# Patient Record
Sex: MALE | Race: WHITE | NOT HISPANIC OR LATINO | URBAN - NONMETROPOLITAN AREA
[De-identification: names, ages, dates, MRNs, and addresses within clinical notes are randomized per-mention and may not be internally consistent; named-entity substitution may affect disease eponyms.]

---

## 2023-08-23 PROBLEM — M19.92 POST-TRAUMATIC OSTEOARTHRITIS: Status: ACTIVE | Noted: 2023-08-23

## 2023-08-23 PROBLEM — M17.2 BILATERAL POST-TRAUMATIC OSTEOARTHRITIS OF KNEE: Status: ACTIVE | Noted: 2023-08-23

## 2023-08-23 PROBLEM — J44.9 COPD (CHRONIC OBSTRUCTIVE PULMONARY DISEASE) (MULTI): Status: ACTIVE | Noted: 2023-08-23

## 2023-08-23 PROBLEM — I10 HYPERTENSION: Status: ACTIVE | Noted: 2023-08-23

## 2023-08-23 PROBLEM — K21.9 GERD (GASTROESOPHAGEAL REFLUX DISEASE): Status: ACTIVE | Noted: 2023-08-23

## 2023-08-23 PROBLEM — Q66.6 VALGUS DEFORMITY OF BOTH FEET: Status: ACTIVE | Noted: 2023-08-23

## 2023-08-23 PROBLEM — Z96.9 RETAINED ORTHOPEDIC HARDWARE: Status: ACTIVE | Noted: 2023-08-23

## 2023-08-23 PROBLEM — F43.0 ACUTE STRESS DISORDER: Status: ACTIVE | Noted: 2023-08-23

## 2023-08-23 PROBLEM — S33.9XXA SPRAIN, LUMBOSACRAL: Status: ACTIVE | Noted: 2023-08-23

## 2023-08-23 PROBLEM — M17.12 PRIMARY OSTEOARTHRITIS OF LEFT KNEE: Status: ACTIVE | Noted: 2023-08-23

## 2023-08-23 PROBLEM — M21.70 LEG LENGTH DISCREPANCY: Status: ACTIVE | Noted: 2023-08-23

## 2023-08-23 RX ORDER — FLUTICASONE FUROATE, UMECLIDINIUM BROMIDE AND VILANTEROL TRIFENATATE 200; 62.5; 25 UG/1; UG/1; UG/1
1 POWDER RESPIRATORY (INHALATION) EVERY 24 HOURS
COMMUNITY
Start: 2023-05-02

## 2023-08-23 RX ORDER — NEBIVOLOL 20 MG/1
20 TABLET ORAL DAILY
COMMUNITY
Start: 2023-05-17 | End: 2024-05-23 | Stop reason: SDUPTHER

## 2023-08-23 RX ORDER — ALBUTEROL SULFATE 90 UG/1
2 AEROSOL, METERED RESPIRATORY (INHALATION) EVERY 4 HOURS PRN
COMMUNITY
Start: 2023-05-02 | End: 2023-11-27 | Stop reason: SDUPTHER

## 2023-09-05 PROBLEM — M25.462 EFFUSION OF LEFT KNEE: Status: ACTIVE | Noted: 2023-09-05

## 2023-09-05 PROBLEM — M25.562 PAIN OF LEFT KNEE AFTER INJURY: Status: ACTIVE | Noted: 2023-09-05

## 2023-09-05 PROBLEM — I82.4Z2 ACUTE DEEP VEIN THROMBOSIS (DVT) OF DISTAL VEIN OF LEFT LOWER EXTREMITY (MULTI): Status: RESOLVED | Noted: 2023-09-05 | Resolved: 2023-09-05

## 2023-09-05 PROBLEM — S82.023A: Status: ACTIVE | Noted: 2023-09-05

## 2023-09-05 PROBLEM — S82.143A TIBIAL PLATEAU FRACTURE: Status: ACTIVE | Noted: 2023-09-05

## 2023-09-05 PROBLEM — M25.662 STIFFNESS OF LEFT KNEE, NOT ELSEWHERE CLASSIFIED: Status: ACTIVE | Noted: 2023-09-05

## 2023-09-05 PROBLEM — S82.142A CLOSED BICONDYLAR FRACTURE OF LEFT TIBIAL PLATEAU: Status: ACTIVE | Noted: 2023-09-05

## 2023-09-05 RX ORDER — OXYCODONE AND ACETAMINOPHEN 5; 325 MG/1; MG/1
1 TABLET ORAL EVERY 4 HOURS PRN
COMMUNITY
Start: 2018-04-05 | End: 2024-01-08 | Stop reason: ALTCHOICE

## 2023-09-05 RX ORDER — OXYCODONE AND ACETAMINOPHEN 10; 325 MG/1; MG/1
1 TABLET ORAL EVERY 6 HOURS PRN
COMMUNITY
Start: 2018-04-03 | End: 2024-01-08 | Stop reason: ALTCHOICE

## 2023-09-05 RX ORDER — DOCUSATE SODIUM 100 MG/1
1 CAPSULE, LIQUID FILLED ORAL 2 TIMES DAILY
COMMUNITY
Start: 2018-04-05 | End: 2024-01-08 | Stop reason: ALTCHOICE

## 2023-09-05 RX ORDER — PANTOPRAZOLE SODIUM 20 MG/1
1 TABLET, DELAYED RELEASE ORAL DAILY
COMMUNITY
Start: 2018-04-06 | End: 2024-01-08 | Stop reason: ALTCHOICE

## 2023-09-05 RX ORDER — ASPIRIN 81 MG/1
1 TABLET ORAL 2 TIMES DAILY
COMMUNITY
Start: 2018-04-06 | End: 2024-01-08 | Stop reason: ALTCHOICE

## 2023-09-05 RX ORDER — HYDROCODONE BITARTRATE AND ACETAMINOPHEN 10; 325 MG/1; MG/1
1 TABLET ORAL 3 TIMES DAILY PRN
COMMUNITY
Start: 2018-02-05 | End: 2024-01-08 | Stop reason: ALTCHOICE

## 2023-11-27 ENCOUNTER — TELEPHONE (OUTPATIENT)
Dept: PRIMARY CARE | Facility: CLINIC | Age: 63
End: 2023-11-27
Payer: COMMERCIAL

## 2023-11-27 DIAGNOSIS — J41.0 SIMPLE CHRONIC BRONCHITIS (MULTI): Primary | ICD-10-CM

## 2023-11-27 RX ORDER — ALBUTEROL SULFATE 90 UG/1
2 AEROSOL, METERED RESPIRATORY (INHALATION) EVERY 4 HOURS PRN
Qty: 18 G | Refills: 6 | Status: SHIPPED | OUTPATIENT
Start: 2023-11-27 | End: 2024-01-08 | Stop reason: ALTCHOICE

## 2023-12-29 ENCOUNTER — APPOINTMENT (OUTPATIENT)
Dept: PRIMARY CARE | Facility: CLINIC | Age: 63
End: 2023-12-29
Payer: COMMERCIAL

## 2024-01-08 ENCOUNTER — OFFICE VISIT (OUTPATIENT)
Dept: PRIMARY CARE | Facility: CLINIC | Age: 64
End: 2024-01-08
Payer: COMMERCIAL

## 2024-01-08 VITALS
DIASTOLIC BLOOD PRESSURE: 80 MMHG | BODY MASS INDEX: 26.63 KG/M2 | HEIGHT: 70 IN | WEIGHT: 186 LBS | TEMPERATURE: 98 F | SYSTOLIC BLOOD PRESSURE: 130 MMHG | OXYGEN SATURATION: 97 % | HEART RATE: 79 BPM

## 2024-01-08 DIAGNOSIS — I10 PRIMARY HYPERTENSION: Primary | ICD-10-CM

## 2024-01-08 PROCEDURE — 3079F DIAST BP 80-89 MM HG: CPT | Performed by: FAMILY MEDICINE

## 2024-01-08 PROCEDURE — 3075F SYST BP GE 130 - 139MM HG: CPT | Performed by: FAMILY MEDICINE

## 2024-01-08 PROCEDURE — 99213 OFFICE O/P EST LOW 20 MIN: CPT | Performed by: FAMILY MEDICINE

## 2024-01-08 ASSESSMENT — PATIENT HEALTH QUESTIONNAIRE - PHQ9
2. FEELING DOWN, DEPRESSED OR HOPELESS: NOT AT ALL
1. LITTLE INTEREST OR PLEASURE IN DOING THINGS: NOT AT ALL
SUM OF ALL RESPONSES TO PHQ9 QUESTIONS 1 AND 2: 0

## 2024-01-08 ASSESSMENT — PAIN SCALES - GENERAL: PAINLEVEL: 0-NO PAIN

## 2024-01-08 ASSESSMENT — ENCOUNTER SYMPTOMS
OCCASIONAL FEELINGS OF UNSTEADINESS: 0
LOSS OF SENSATION IN FEET: 0
DEPRESSION: 0

## 2024-01-08 NOTE — PROGRESS NOTES
"Subjective:   Andrez Flores is a 63 y.o. male with hypertension.  Current Outpatient Medications   Medication Sig Dispense Refill    fluticasone-umeclidin-vilanter (Trelegy Ellipta) 200-62.5-25 mcg blister with device Inhale 1 puff once every 24 hours.      nebivolol (Bystolic) 20 mg tablet Take 1 tablet (20 mg) by mouth once daily.       No current facility-administered medications for this visit.      Hypertension ROS: taking medications as instructed, no medication side effects noted, no TIA's, no chest pain on exertion, no dyspnea on exertion, and no swelling of ankles.   New concerns: none  .     Objective:   /80   Pulse 79   Temp 36.7 °C (98 °F)   Ht 1.778 m (5' 10\")   Wt 84.4 kg (186 lb)   SpO2 97%   BMI 26.69 kg/m²    Appearance alert, well appearing, and in no distress.  General exam BP noted to be well controlled today in office, S1, S2 normal, no gallop, no murmur, chest clear, no JVD, no HSM, no edema.   Lab review: no lab studies available for review at time of visit.     Assessment:    Hypertension well controlled.     Plan:   Current treatment plan is effective, no change in therapy..  "

## 2024-02-03 DIAGNOSIS — R06.02 SHORTNESS OF BREATH: Primary | ICD-10-CM

## 2024-02-11 ENCOUNTER — APPOINTMENT (OUTPATIENT)
Dept: CARDIOLOGY | Facility: HOSPITAL | Age: 64
DRG: 065 | End: 2024-02-11
Payer: COMMERCIAL

## 2024-02-11 ENCOUNTER — APPOINTMENT (OUTPATIENT)
Dept: RADIOLOGY | Facility: HOSPITAL | Age: 64
DRG: 065 | End: 2024-02-11
Payer: COMMERCIAL

## 2024-02-11 ENCOUNTER — HOSPITAL ENCOUNTER (INPATIENT)
Facility: HOSPITAL | Age: 64
LOS: 1 days | Discharge: HOME | DRG: 065 | End: 2024-02-13
Attending: INTERNAL MEDICINE | Admitting: INTERNAL MEDICINE
Payer: COMMERCIAL

## 2024-02-11 DIAGNOSIS — R94.31 ABNORMAL ELECTROCARDIOGRAM (ECG) (EKG): ICD-10-CM

## 2024-02-11 DIAGNOSIS — R42 DIZZINESS: Primary | ICD-10-CM

## 2024-02-11 DIAGNOSIS — I10 PRIMARY HYPERTENSION: ICD-10-CM

## 2024-02-11 DIAGNOSIS — I63.9 CEREBROVASCULAR ACCIDENT (CVA), UNSPECIFIED MECHANISM (MULTI): ICD-10-CM

## 2024-02-11 DIAGNOSIS — R42 DIZZINESS AND GIDDINESS: ICD-10-CM

## 2024-02-11 DIAGNOSIS — I42.6 ALCOHOLIC CARDIOMYOPATHY (MULTI): ICD-10-CM

## 2024-02-11 DIAGNOSIS — H53.2 DOUBLE VISION: ICD-10-CM

## 2024-02-11 LAB
ALBUMIN SERPL BCP-MCNC: 4.4 G/DL (ref 3.4–5)
ALP SERPL-CCNC: 24 U/L (ref 33–136)
ALT SERPL W P-5'-P-CCNC: 18 U/L (ref 10–52)
AMPHETAMINES UR QL SCN: NORMAL
ANION GAP SERPL CALC-SCNC: 14 MMOL/L (ref 10–20)
APPEARANCE UR: CLEAR
APTT PPP: 36 SECONDS (ref 27–38)
AST SERPL W P-5'-P-CCNC: 15 U/L (ref 9–39)
BARBITURATES UR QL SCN: NORMAL
BASOPHILS # BLD AUTO: 0.09 X10*3/UL (ref 0–0.1)
BASOPHILS NFR BLD AUTO: 1.1 %
BENZODIAZ UR QL SCN: NORMAL
BILIRUB SERPL-MCNC: 0.5 MG/DL (ref 0–1.2)
BILIRUB UR STRIP.AUTO-MCNC: NEGATIVE MG/DL
BUN SERPL-MCNC: 16 MG/DL (ref 6–23)
BZE UR QL SCN: NORMAL
CALCIUM SERPL-MCNC: 10.1 MG/DL (ref 8.6–10.3)
CANNABINOIDS UR QL SCN: NORMAL
CARDIAC TROPONIN I PNL SERPL HS: 5 NG/L (ref 0–20)
CHLORIDE SERPL-SCNC: 105 MMOL/L (ref 98–107)
CO2 SERPL-SCNC: 24 MMOL/L (ref 21–32)
COLOR UR: YELLOW
CREAT SERPL-MCNC: 0.95 MG/DL (ref 0.5–1.3)
EGFRCR SERPLBLD CKD-EPI 2021: 89 ML/MIN/1.73M*2
EOSINOPHIL # BLD AUTO: 0.07 X10*3/UL (ref 0–0.7)
EOSINOPHIL NFR BLD AUTO: 0.9 %
ERYTHROCYTE [DISTWIDTH] IN BLOOD BY AUTOMATED COUNT: 13.1 % (ref 11.5–14.5)
ETHANOL SERPL-MCNC: <10 MG/DL
FENTANYL+NORFENTANYL UR QL SCN: NORMAL
FLUAV RNA RESP QL NAA+PROBE: NOT DETECTED
FLUBV RNA RESP QL NAA+PROBE: NOT DETECTED
GLUCOSE SERPL-MCNC: 105 MG/DL (ref 74–99)
GLUCOSE UR STRIP.AUTO-MCNC: NEGATIVE MG/DL
HCT VFR BLD AUTO: 49.6 % (ref 41–52)
HGB BLD-MCNC: 16.7 G/DL (ref 13.5–17.5)
IMM GRANULOCYTES # BLD AUTO: 0.04 X10*3/UL (ref 0–0.7)
IMM GRANULOCYTES NFR BLD AUTO: 0.5 % (ref 0–0.9)
INR PPP: 1.1 (ref 0.9–1.1)
KETONES UR STRIP.AUTO-MCNC: ABNORMAL MG/DL
LEUKOCYTE ESTERASE UR QL STRIP.AUTO: NEGATIVE
LYMPHOCYTES # BLD AUTO: 2.51 X10*3/UL (ref 1.2–4.8)
LYMPHOCYTES NFR BLD AUTO: 30.9 %
MAGNESIUM SERPL-MCNC: 2.01 MG/DL (ref 1.6–2.4)
MCH RBC QN AUTO: 31.9 PG (ref 26–34)
MCHC RBC AUTO-ENTMCNC: 33.7 G/DL (ref 32–36)
MCV RBC AUTO: 95 FL (ref 80–100)
MONOCYTES # BLD AUTO: 0.56 X10*3/UL (ref 0.1–1)
MONOCYTES NFR BLD AUTO: 6.9 %
MUCOUS THREADS #/AREA URNS AUTO: NORMAL /LPF
NEUTROPHILS # BLD AUTO: 4.86 X10*3/UL (ref 1.2–7.7)
NEUTROPHILS NFR BLD AUTO: 59.7 %
NITRITE UR QL STRIP.AUTO: NEGATIVE
NRBC BLD-RTO: 0 /100 WBCS (ref 0–0)
OPIATES UR QL SCN: NORMAL
OXYCODONE+OXYMORPHONE UR QL SCN: NORMAL
PCP UR QL SCN: NORMAL
PH UR STRIP.AUTO: 6 [PH]
PLATELET # BLD AUTO: 233 X10*3/UL (ref 150–450)
POTASSIUM SERPL-SCNC: 4 MMOL/L (ref 3.5–5.3)
PROT SERPL-MCNC: 7.3 G/DL (ref 6.4–8.2)
PROT UR STRIP.AUTO-MCNC: NEGATIVE MG/DL
PROTHROMBIN TIME: 11.9 SECONDS (ref 9.8–12.8)
RBC # BLD AUTO: 5.24 X10*6/UL (ref 4.5–5.9)
RBC # UR STRIP.AUTO: ABNORMAL /UL
RBC #/AREA URNS AUTO: NORMAL /HPF
SARS-COV-2 RNA RESP QL NAA+PROBE: NOT DETECTED
SODIUM SERPL-SCNC: 139 MMOL/L (ref 136–145)
SP GR UR STRIP.AUTO: 1.01
TSH SERPL-ACNC: 1.44 MIU/L (ref 0.44–3.98)
UROBILINOGEN UR STRIP.AUTO-MCNC: <2 MG/DL
WBC # BLD AUTO: 8.1 X10*3/UL (ref 4.4–11.3)
WBC #/AREA URNS AUTO: NORMAL /HPF

## 2024-02-11 PROCEDURE — 93880 EXTRACRANIAL BILAT STUDY: CPT

## 2024-02-11 PROCEDURE — 85025 COMPLETE CBC W/AUTO DIFF WBC: CPT | Performed by: PHYSICIAN ASSISTANT

## 2024-02-11 PROCEDURE — 70496 CT ANGIOGRAPHY HEAD: CPT

## 2024-02-11 PROCEDURE — 93005 ELECTROCARDIOGRAM TRACING: CPT | Mod: IPSPLIT

## 2024-02-11 PROCEDURE — 87636 SARSCOV2 & INF A&B AMP PRB: CPT | Performed by: PHYSICIAN ASSISTANT

## 2024-02-11 PROCEDURE — 99222 1ST HOSP IP/OBS MODERATE 55: CPT | Performed by: NURSE PRACTITIONER

## 2024-02-11 PROCEDURE — 2500000004 HC RX 250 GENERAL PHARMACY W/ HCPCS (ALT 636 FOR OP/ED): Performed by: NURSE PRACTITIONER

## 2024-02-11 PROCEDURE — 85730 THROMBOPLASTIN TIME PARTIAL: CPT | Performed by: PHYSICIAN ASSISTANT

## 2024-02-11 PROCEDURE — 70450 CT HEAD/BRAIN W/O DYE: CPT | Performed by: RADIOLOGY

## 2024-02-11 PROCEDURE — 9420000001 HC RT PATIENT EDUCATION 5 MIN

## 2024-02-11 PROCEDURE — 84484 ASSAY OF TROPONIN QUANT: CPT | Performed by: PHYSICIAN ASSISTANT

## 2024-02-11 PROCEDURE — 82077 ASSAY SPEC XCP UR&BREATH IA: CPT | Performed by: PHYSICIAN ASSISTANT

## 2024-02-11 PROCEDURE — 2500000001 HC RX 250 WO HCPCS SELF ADMINISTERED DRUGS (ALT 637 FOR MEDICARE OP): Performed by: NURSE PRACTITIONER

## 2024-02-11 PROCEDURE — 96374 THER/PROPH/DIAG INJ IV PUSH: CPT | Mod: 59

## 2024-02-11 PROCEDURE — 70496 CT ANGIOGRAPHY HEAD: CPT | Performed by: STUDENT IN AN ORGANIZED HEALTH CARE EDUCATION/TRAINING PROGRAM

## 2024-02-11 PROCEDURE — 81001 URINALYSIS AUTO W/SCOPE: CPT | Performed by: PHYSICIAN ASSISTANT

## 2024-02-11 PROCEDURE — 94664 DEMO&/EVAL PT USE INHALER: CPT

## 2024-02-11 PROCEDURE — 71045 X-RAY EXAM CHEST 1 VIEW: CPT

## 2024-02-11 PROCEDURE — 2550000001 HC RX 255 CONTRASTS: Performed by: PHYSICIAN ASSISTANT

## 2024-02-11 PROCEDURE — 70498 CT ANGIOGRAPHY NECK: CPT | Performed by: STUDENT IN AN ORGANIZED HEALTH CARE EDUCATION/TRAINING PROGRAM

## 2024-02-11 PROCEDURE — 2500000001 HC RX 250 WO HCPCS SELF ADMINISTERED DRUGS (ALT 637 FOR MEDICARE OP): Performed by: PHYSICIAN ASSISTANT

## 2024-02-11 PROCEDURE — 70450 CT HEAD/BRAIN W/O DYE: CPT | Mod: 59

## 2024-02-11 PROCEDURE — 2500000004 HC RX 250 GENERAL PHARMACY W/ HCPCS (ALT 636 FOR OP/ED): Performed by: PHYSICIAN ASSISTANT

## 2024-02-11 PROCEDURE — 83735 ASSAY OF MAGNESIUM: CPT | Performed by: PHYSICIAN ASSISTANT

## 2024-02-11 PROCEDURE — 93005 ELECTROCARDIOGRAM TRACING: CPT

## 2024-02-11 PROCEDURE — 87086 URINE CULTURE/COLONY COUNT: CPT | Mod: GENLAB | Performed by: PHYSICIAN ASSISTANT

## 2024-02-11 PROCEDURE — 93010 ELECTROCARDIOGRAM REPORT: CPT | Performed by: INTERNAL MEDICINE

## 2024-02-11 PROCEDURE — 80307 DRUG TEST PRSMV CHEM ANLYZR: CPT | Performed by: PHYSICIAN ASSISTANT

## 2024-02-11 PROCEDURE — S4991 NICOTINE PATCH NONLEGEND: HCPCS | Performed by: NURSE PRACTITIONER

## 2024-02-11 PROCEDURE — 2500000002 HC RX 250 W HCPCS SELF ADMINISTERED DRUGS (ALT 637 FOR MEDICARE OP, ALT 636 FOR OP/ED): Mod: MUE | Performed by: NURSE PRACTITIONER

## 2024-02-11 PROCEDURE — 96361 HYDRATE IV INFUSION ADD-ON: CPT

## 2024-02-11 PROCEDURE — G0378 HOSPITAL OBSERVATION PER HR: HCPCS

## 2024-02-11 PROCEDURE — 99285 EMERGENCY DEPT VISIT HI MDM: CPT | Mod: 25,27

## 2024-02-11 PROCEDURE — 36415 COLL VENOUS BLD VENIPUNCTURE: CPT | Performed by: PHYSICIAN ASSISTANT

## 2024-02-11 PROCEDURE — 84443 ASSAY THYROID STIM HORMONE: CPT | Performed by: NURSE PRACTITIONER

## 2024-02-11 PROCEDURE — 80053 COMPREHEN METABOLIC PANEL: CPT | Performed by: PHYSICIAN ASSISTANT

## 2024-02-11 PROCEDURE — 70498 CT ANGIOGRAPHY NECK: CPT

## 2024-02-11 PROCEDURE — 85610 PROTHROMBIN TIME: CPT | Performed by: PHYSICIAN ASSISTANT

## 2024-02-11 PROCEDURE — 71045 X-RAY EXAM CHEST 1 VIEW: CPT | Performed by: RADIOLOGY

## 2024-02-11 RX ORDER — METOPROLOL SUCCINATE 100 MG/1
200 TABLET, EXTENDED RELEASE ORAL DAILY
Status: DISCONTINUED | OUTPATIENT
Start: 2024-02-11 | End: 2024-02-13 | Stop reason: HOSPADM

## 2024-02-11 RX ORDER — ONDANSETRON 4 MG/1
4 TABLET, FILM COATED ORAL EVERY 8 HOURS PRN
Status: DISCONTINUED | OUTPATIENT
Start: 2024-02-11 | End: 2024-02-13 | Stop reason: HOSPADM

## 2024-02-11 RX ORDER — ACETAMINOPHEN 160 MG/5ML
650 SOLUTION ORAL EVERY 4 HOURS PRN
Status: DISCONTINUED | OUTPATIENT
Start: 2024-02-11 | End: 2024-02-12

## 2024-02-11 RX ORDER — DIAZEPAM 5 MG/1
5 TABLET ORAL ONCE
Status: COMPLETED | OUTPATIENT
Start: 2024-02-11 | End: 2024-02-11

## 2024-02-11 RX ORDER — ENOXAPARIN SODIUM 100 MG/ML
40 INJECTION SUBCUTANEOUS EVERY 24 HOURS
Status: DISCONTINUED | OUTPATIENT
Start: 2024-02-11 | End: 2024-02-13 | Stop reason: HOSPADM

## 2024-02-11 RX ORDER — ACETAMINOPHEN 325 MG/1
650 TABLET ORAL EVERY 4 HOURS PRN
Status: DISCONTINUED | OUTPATIENT
Start: 2024-02-11 | End: 2024-02-13 | Stop reason: HOSPADM

## 2024-02-11 RX ORDER — SODIUM CHLORIDE 9 MG/ML
100 INJECTION, SOLUTION INTRAVENOUS CONTINUOUS
Status: DISCONTINUED | OUTPATIENT
Start: 2024-02-11 | End: 2024-02-13 | Stop reason: HOSPADM

## 2024-02-11 RX ORDER — ACETAMINOPHEN 650 MG/1
650 SUPPOSITORY RECTAL EVERY 4 HOURS PRN
Status: DISCONTINUED | OUTPATIENT
Start: 2024-02-11 | End: 2024-02-12

## 2024-02-11 RX ORDER — FLUTICASONE FUROATE AND VILANTEROL 200; 25 UG/1; UG/1
1 POWDER RESPIRATORY (INHALATION)
Status: DISCONTINUED | OUTPATIENT
Start: 2024-02-12 | End: 2024-02-13 | Stop reason: HOSPADM

## 2024-02-11 RX ORDER — ALBUTEROL SULFATE 0.83 MG/ML
0.63 SOLUTION RESPIRATORY (INHALATION) EVERY 6 HOURS PRN
Status: DISCONTINUED | OUTPATIENT
Start: 2024-02-11 | End: 2024-02-13 | Stop reason: HOSPADM

## 2024-02-11 RX ORDER — FAMOTIDINE 10 MG/ML
20 INJECTION INTRAVENOUS 2 TIMES DAILY
Status: DISCONTINUED | OUTPATIENT
Start: 2024-02-11 | End: 2024-02-13 | Stop reason: HOSPADM

## 2024-02-11 RX ORDER — METOPROLOL SUCCINATE 100 MG/1
200 TABLET, EXTENDED RELEASE ORAL DAILY
Status: DISCONTINUED | OUTPATIENT
Start: 2024-02-11 | End: 2024-02-11

## 2024-02-11 RX ORDER — MECLIZINE HCL 12.5 MG 12.5 MG/1
25 TABLET ORAL ONCE
Status: COMPLETED | OUTPATIENT
Start: 2024-02-11 | End: 2024-02-11

## 2024-02-11 RX ORDER — TALC
3 POWDER (GRAM) TOPICAL NIGHTLY PRN
Status: DISCONTINUED | OUTPATIENT
Start: 2024-02-11 | End: 2024-02-13 | Stop reason: HOSPADM

## 2024-02-11 RX ORDER — HYDRALAZINE HYDROCHLORIDE 20 MG/ML
10 INJECTION INTRAMUSCULAR; INTRAVENOUS ONCE
Status: COMPLETED | OUTPATIENT
Start: 2024-02-11 | End: 2024-02-11

## 2024-02-11 RX ORDER — FAMOTIDINE 20 MG/1
20 TABLET, FILM COATED ORAL 2 TIMES DAILY
Status: DISCONTINUED | OUTPATIENT
Start: 2024-02-11 | End: 2024-02-13 | Stop reason: HOSPADM

## 2024-02-11 RX ORDER — ALBUTEROL SULFATE 0.63 MG/3ML
0.63 SOLUTION RESPIRATORY (INHALATION) EVERY 6 HOURS PRN
COMMUNITY

## 2024-02-11 RX ORDER — ONDANSETRON HYDROCHLORIDE 2 MG/ML
4 INJECTION, SOLUTION INTRAVENOUS EVERY 8 HOURS PRN
Status: DISCONTINUED | OUTPATIENT
Start: 2024-02-11 | End: 2024-02-13 | Stop reason: HOSPADM

## 2024-02-11 RX ORDER — IBUPROFEN 200 MG
1 TABLET ORAL DAILY
Status: DISCONTINUED | OUTPATIENT
Start: 2024-02-11 | End: 2024-02-13 | Stop reason: HOSPADM

## 2024-02-11 RX ADMIN — FAMOTIDINE 20 MG: 20 TABLET, FILM COATED ORAL at 20:27

## 2024-02-11 RX ADMIN — IOHEXOL 75 ML: 350 INJECTION, SOLUTION INTRAVENOUS at 16:03

## 2024-02-11 RX ADMIN — DIAZEPAM 5 MG: 5 TABLET ORAL at 19:49

## 2024-02-11 RX ADMIN — METOPROLOL SUCCINATE 200 MG: 100 TABLET, EXTENDED RELEASE ORAL at 15:50

## 2024-02-11 RX ADMIN — NICOTINE 1 PATCH: 14 PATCH, EXTENDED RELEASE TRANSDERMAL at 20:27

## 2024-02-11 RX ADMIN — MECLIZINE HYDROCHLORIDE 25 MG: 12.5 TABLET ORAL at 15:50

## 2024-02-11 RX ADMIN — SODIUM CHLORIDE 500 ML: 9 INJECTION, SOLUTION INTRAVENOUS at 14:20

## 2024-02-11 RX ADMIN — SODIUM CHLORIDE 100 ML/HR: 9 INJECTION, SOLUTION INTRAVENOUS at 20:27

## 2024-02-11 RX ADMIN — HYDRALAZINE HYDROCHLORIDE 10 MG: 20 INJECTION INTRAMUSCULAR; INTRAVENOUS at 15:53

## 2024-02-11 RX ADMIN — ENOXAPARIN SODIUM 40 MG: 40 INJECTION SUBCUTANEOUS at 20:27

## 2024-02-11 SDOH — SOCIAL STABILITY: SOCIAL INSECURITY: DO YOU FEEL UNSAFE GOING BACK TO THE PLACE WHERE YOU ARE LIVING?: NO

## 2024-02-11 SDOH — SOCIAL STABILITY: SOCIAL INSECURITY: WERE YOU ABLE TO COMPLETE ALL THE BEHAVIORAL HEALTH SCREENINGS?: YES

## 2024-02-11 SDOH — SOCIAL STABILITY: SOCIAL INSECURITY: ABUSE: ADULT

## 2024-02-11 SDOH — SOCIAL STABILITY: SOCIAL INSECURITY: ARE THERE ANY APPARENT SIGNS OF INJURIES/BEHAVIORS THAT COULD BE RELATED TO ABUSE/NEGLECT?: NO

## 2024-02-11 SDOH — SOCIAL STABILITY: SOCIAL INSECURITY: ARE YOU OR HAVE YOU BEEN THREATENED OR ABUSED PHYSICALLY, EMOTIONALLY, OR SEXUALLY BY ANYONE?: NO

## 2024-02-11 SDOH — SOCIAL STABILITY: SOCIAL INSECURITY: HAVE YOU HAD THOUGHTS OF HARMING ANYONE ELSE?: NO

## 2024-02-11 SDOH — SOCIAL STABILITY: SOCIAL INSECURITY: DO YOU FEEL ANYONE HAS EXPLOITED OR TAKEN ADVANTAGE OF YOU FINANCIALLY OR OF YOUR PERSONAL PROPERTY?: NO

## 2024-02-11 SDOH — SOCIAL STABILITY: SOCIAL INSECURITY: DOES ANYONE TRY TO KEEP YOU FROM HAVING/CONTACTING OTHER FRIENDS OR DOING THINGS OUTSIDE YOUR HOME?: NO

## 2024-02-11 SDOH — SOCIAL STABILITY: SOCIAL INSECURITY: HAS ANYONE EVER THREATENED TO HURT YOUR FAMILY OR YOUR PETS?: NO

## 2024-02-11 ASSESSMENT — ACTIVITIES OF DAILY LIVING (ADL)
LACK_OF_TRANSPORTATION: NO
FEEDING YOURSELF: INDEPENDENT
BATHING: INDEPENDENT
GROOMING: INDEPENDENT
DRESSING YOURSELF: INDEPENDENT
HEARING - RIGHT EAR: FUNCTIONAL
ADEQUATE_TO_COMPLETE_ADL: YES
LACK_OF_TRANSPORTATION: NO
JUDGMENT_ADEQUATE_SAFELY_COMPLETE_DAILY_ACTIVITIES: YES
HEARING - LEFT EAR: FUNCTIONAL
TOILETING: INDEPENDENT
PATIENT'S MEMORY ADEQUATE TO SAFELY COMPLETE DAILY ACTIVITIES?: YES
WALKS IN HOME: INDEPENDENT

## 2024-02-11 ASSESSMENT — LIFESTYLE VARIABLES
AUDIT TOTAL SCORE: 0
HOW OFTEN DURING THE LAST YEAR HAVE YOU HAD A FEELING OF GUILT OR REMORSE AFTER DRINKING: NEVER
HOW OFTEN DURING THE LAST YEAR HAVE YOU BEEN UNABLE TO REMEMBER WHAT HAPPENED THE NIGHT BEFORE BECAUSE YOU HAD BEEN DRINKING: NEVER
HOW OFTEN DO YOU HAVE A DRINK CONTAINING ALCOHOL: 4 OR MORE TIMES A WEEK
AUDIT TOTAL SCORE: -1
HOW OFTEN DURING THE LAST YEAR HAVE YOU NEEDED AN ALCOHOLIC DRINK FIRST THING IN THE MORNING TO GET YOURSELF GOING AFTER A NIGHT OF HEAVY DRINKING: NEVER
HOW OFTEN DO YOU HAVE 6 OR MORE DRINKS ON ONE OCCASION: PATIENT DECLINED
HOW MANY STANDARD DRINKS CONTAINING ALCOHOL DO YOU HAVE ON A TYPICAL DAY: 3 OR 4
HOW OFTEN DURING THE LAST YEAR HAVE YOU FOUND THAT YOU WERE NOT ABLE TO STOP DRINKING ONCE YOU HAD STARTED: NEVER
AUDIT-C TOTAL SCORE: -1
AUDIT-C TOTAL SCORE: -1
HAVE YOU OR SOMEONE ELSE BEEN INJURED AS A RESULT OF YOUR DRINKING: NO
SKIP TO QUESTIONS 9-10: 0
HOW OFTEN DURING THE LAST YEAR HAVE YOU FAILED TO DO WHAT WAS NORMALLY EXPECTED FROM YOU BECAUSE OF DRINKING: NEVER
HAS A RELATIVE, FRIEND, DOCTOR, OR ANOTHER HEALTH PROFESSIONAL EXPRESSED CONCERN ABOUT YOUR DRINKING OR SUGGESTED YOU CUT DOWN: NO

## 2024-02-11 ASSESSMENT — COGNITIVE AND FUNCTIONAL STATUS - GENERAL
CLIMB 3 TO 5 STEPS WITH RAILING: A LITTLE
MOBILITY SCORE: 20
STANDING UP FROM CHAIR USING ARMS: A LITTLE
WALKING IN HOSPITAL ROOM: A LITTLE
MOVING TO AND FROM BED TO CHAIR: A LITTLE
DAILY ACTIVITIY SCORE: 24
PATIENT BASELINE BEDBOUND: NO

## 2024-02-11 ASSESSMENT — PAIN SCALES - GENERAL
PAINLEVEL_OUTOF10: 0 - NO PAIN

## 2024-02-11 ASSESSMENT — ENCOUNTER SYMPTOMS
CARDIOVASCULAR NEGATIVE: 1
HEMATOLOGIC/LYMPHATIC NEGATIVE: 1
DIZZINESS: 1
HEADACHES: 1
MUSCULOSKELETAL NEGATIVE: 1
ACTIVITY CHANGE: 1
ENDOCRINE NEGATIVE: 1
RESPIRATORY NEGATIVE: 1
GASTROINTESTINAL NEGATIVE: 1

## 2024-02-11 ASSESSMENT — PAIN - FUNCTIONAL ASSESSMENT
PAIN_FUNCTIONAL_ASSESSMENT: 0-10
PAIN_FUNCTIONAL_ASSESSMENT: 0-10

## 2024-02-11 ASSESSMENT — PATIENT HEALTH QUESTIONNAIRE - PHQ9
SUM OF ALL RESPONSES TO PHQ9 QUESTIONS 1 & 2: 0
2. FEELING DOWN, DEPRESSED OR HOPELESS: NOT AT ALL
1. LITTLE INTEREST OR PLEASURE IN DOING THINGS: NOT AT ALL

## 2024-02-11 ASSESSMENT — VISUAL ACUITY
OS: 20/40
OU: 20 70
OD: 20/40

## 2024-02-11 ASSESSMENT — COLUMBIA-SUICIDE SEVERITY RATING SCALE - C-SSRS
6. HAVE YOU EVER DONE ANYTHING, STARTED TO DO ANYTHING, OR PREPARED TO DO ANYTHING TO END YOUR LIFE?: NO
2. HAVE YOU ACTUALLY HAD ANY THOUGHTS OF KILLING YOURSELF?: NO
1. IN THE PAST MONTH, HAVE YOU WISHED YOU WERE DEAD OR WISHED YOU COULD GO TO SLEEP AND NOT WAKE UP?: NO

## 2024-02-11 NOTE — ED PROVIDER NOTES
"HPI   Chief Complaint   Patient presents with    Eye Problem     Patient states he has had blurry vision in both eyes since waking up, he feels a little dizzy and has nausea as well. Denies chest pain        64 year-old male with past medical history positive for tobacco use, daily alcohol use, and hypertension woke up this morning with blurry vision slight dizziness    Patient states \"when I close 1 eye no blurry vision, but when I have both eyes open everything appears blurry\"  He can see without difficulty when he closes his right eye or when he closes his left eye blurry vision is present though whenever he has both eyes open  And is described as slight room spinning    States he drinks 4 cans of beer daily  Smokes 1 pack/day                          No data recorded                   Patient History   No past medical history on file.  Past Surgical History:   Procedure Laterality Date    KNEE Left 2018    knee surgery     Family History   Problem Relation Name Age of Onset    Hypothyroidism Mother      Other (mva) Father      Heart attack Daughter      Parkinsonism Maternal Grandfather       Social History     Tobacco Use    Smoking status: Every Day     Packs/day: 1.00     Years: 15.00     Additional pack years: 0.00     Total pack years: 15.00     Types: Cigarettes     Passive exposure: Current    Smokeless tobacco: Never   Vaping Use    Vaping Use: Never used   Substance Use Topics    Alcohol use: Yes     Alcohol/week: 28.0 standard drinks of alcohol     Types: 28 Cans of beer per week     Comment: 4 beers a day    Drug use: Never       Physical Exam   ED Triage Vitals [02/11/24 1253]   Temperature Heart Rate Respirations BP   36.4 °C (97.6 °F) (!) 103 16 (!) 184/97      Pulse Ox Temp Source Heart Rate Source Patient Position   98 % Temporal Monitor Sitting      BP Location FiO2 (%)     Left arm --       Physical Exam  Vitals and nursing note reviewed.   Constitutional:       General: He is not in acute " distress.     Appearance: Normal appearance. He is well-developed.   HENT:      Head: Normocephalic and atraumatic.      Right Ear: Tympanic membrane, ear canal and external ear normal.      Left Ear: Tympanic membrane, ear canal and external ear normal.      Nose: Nose normal.      Mouth/Throat:      Mouth: Mucous membranes are moist.   Eyes:      Extraocular Movements: Extraocular movements intact.      Conjunctiva/sclera: Conjunctivae normal.      Pupils: Pupils are equal, round, and reactive to light.   Cardiovascular:      Rate and Rhythm: Regular rhythm. Tachycardia present.      Heart sounds: No murmur heard.  Pulmonary:      Effort: Pulmonary effort is normal. No respiratory distress.      Breath sounds: Normal breath sounds.   Abdominal:      General: Abdomen is flat.      Palpations: Abdomen is soft.      Tenderness: There is no abdominal tenderness.   Musculoskeletal:         General: No swelling.      Cervical back: Neck supple.   Skin:     General: Skin is warm and dry.      Capillary Refill: Capillary refill takes less than 2 seconds.   Neurological:      General: No focal deficit present.      Mental Status: He is alert and oriented to person, place, and time.   Psychiatric:         Mood and Affect: Mood normal.     Patient's blood pressure improved    ED Course & MDM   Diagnoses as of 02/11/24 1729   Dizziness   Double vision       Medical Decision Making  But still having moderate to severe intractable dizziness and blurry vision  Repeated and is 0 ,     Labs Reviewed  COMPREHENSIVE METABOLIC PANEL - Abnormal     Glucose                       105 (*)                Sodium                        139                    Potassium                     4.0                    Chloride                      105                    Bicarbonate                   24                     Anion Gap                     14                     Urea Nitrogen                 16                     Creatinine                     0.95                   eGFR                          89                     Calcium                       10.1                   Albumin                       4.4                    Alkaline Phosphatase          24 (*)                 Total Protein                 7.3                    AST                           15                     Bilirubin, Total              0.5                    ALT                           18                  URINALYSIS WITH REFLEX MICROSCOPIC - Abnormal     Color, Urine                  Yellow                 Appearance, Urine             Clear                  Specific Gravity, Urine       1.011                  pH, Urine                     6.0                    Protein, Urine                NEGATIVE                Glucose, Urine                NEGATIVE                Blood, Urine                  SMALL (1+) (*)               Ketones, Urine                5 (TRACE) (*)               Bilirubin, Urine              NEGATIVE                Urobilinogen, Urine           <2.0                   Nitrite, Urine                NEGATIVE                Leukocyte Esterase, Urine     NEGATIVE             MAGNESIUM - Normal     Magnesium                     2.01                TROPONIN I, HIGH SENSITIVITY - Normal     Troponin I, High Sensitivity   5                          Narrative: Less than 99th percentile of normal range cutoff-                  Female and children under 18 years old <14 ng/L; Male <21 ng/L: Negative                  Repeat testing should be performed if clinically indicated.                                     Female and children under 18 years old 14-50 ng/L; Male 21-50 ng/L:                  Consistent with possible cardiac damage and possible increased clinical                   risk. Serial measurements may help to assess extent of myocardial damage.                                     >50 ng/L: Consistent with cardiac damage, increased clinical risk and                   myocardial infarction. Serial measurements may help assess extent of                   myocardial damage.                                      NOTE: Children less than 1 year old may have higher baseline troponin                   levels and results should be interpreted in conjunction with the overall                   clinical context.                                     NOTE: Troponin I testing is performed using a different                   testing methodology at [facility] than at other                   [facility]. Direct result comparisons should only                   be made within the same method.  PROTIME-INR - Normal     Protime                       11.9                   INR                           1.1                 APTT - Normal     aPTT                          36                         Narrative: The APTT is no longer used for monitoring Unfractionated Heparin Therapy. For monitoring Heparin Therapy, use the Heparin Assay.  ALCOHOL - Normal     Alcohol                       <10                 DRUG SCREEN,URINE - Normal     Amphetamine Screen, Urine                            Barbiturate Screen, Urine                            Benzodiazepines Screen, Urine                          Cannabinoid Screen, Urine                            Cocaine Metabolite Screen, Urine                          Fentanyl Screen, Urine                               Opiate Screen, Urine                                 Oxycodone Screen, Urine                              PCP Screen, Urine                                        Narrative: Drug screen results are presumptive and should not be used to assess                   compliance with prescribed medication. Contact the performing [facility] laboratory                   to add-on definitive confirmatory testing if clinically indicated.                                    Toxicology screening results are reported qualitatively. The  concentration must                   be greater than or equal to the cutoff to be reported as positive. The concentration                   at which the screening test can detect an individual drug or metabolite varies.                   The absence of expected drug(s) and/or drug metabolite(s) may indicate non-compliance,                   inappropriate timing of specimen collection relative to drug administration, poor drug                   absorption, diluted/adulterated urine, or limitations of testing. For medical purposes                   only; not valid for forensic use.                                    Interpretive questions should be directed to the laboratory medical directors.  SARS-COV-2 AND INFLUENZA A/B PCR - Normal     Flu A Result                                         Flu B Result                                         Coronavirus 2019, PCR                                    Narrative: This assay has received FDA Emergency Use Authorization (EUA) and  is only authorized for the duration of time that circumstances exist to justify the authorization of the emergency use of in vitro diagnostic tests for the detection of SARS-CoV-2 virus and/or diagnosis of COVID-19 infection under section 564(b)(1) of the Act, 21 U.S.C. 360bbb-3(b)(1). Testing for SARS-CoV-2 is only recommended for patients who meet current clinical and/or epidemiological criteria as defined by federal, state, or local public health directives. This assay is an in vitro diagnostic nucleic acid amplification test for the qualitative detection of SARS-CoV-2, Influenza A, and Influenza B from nasopharyngeal specimens and has been validated for use at [facility]. Negative results do not preclude COVID-19 infections or Influenza A/B infections, and should not be used as the sole basis for diagnosis, treatment, or other management decisions. If Influenza A/B and RSV PCR results are negative, testing for  Parainfluenza virus, Adenovirus and Metapneumovirus is routinely performed for [facility] pediatric oncology and intensive care inpatients, and is available on other patients by placing an add-on request.   URINE CULTURE  CBC WITH AUTO DIFFERENTIAL     WBC                           8.1                    nRBC                          0.0                    RBC                           5.24                   Hemoglobin                    16.7                   Hematocrit                    49.6                   MCV                           95                     MCH                           31.9                   MCHC                          33.7                   RDW                           13.1                   Platelets                     233                    Neutrophils %                 59.7                   Immature Granulocytes %, Automated   0.5                    Lymphocytes %                 30.9                   Monocytes %                   6.9                    Eosinophils %                 0.9                    Basophils %                   1.1                    Neutrophils Absolute          4.86                   Immature Granulocytes Absolute, Au*   0.04                   Lymphocytes Absolute          2.51                   Monocytes Absolute            0.56                   Eosinophils Absolute          0.07                   Basophils Absolute            0.09                MICROSCOPIC ONLY, URINE      CT angio neck   Final Result    1. Limited exam, no high-grade narrowing or occlusion in the head or    neck.    2. Incidental right thyroid nodule, recommend comparison with prior    outside imaging or nonemergent follow-up ultrasound for further    characterization.          MACRO:    None          Signed by: Ramez Lane 2/11/2024 4:15 PM    Dictation workstation:   [id number]     CT angio head w and wo IV contrast   Final Result    1. Limited exam, no high-grade narrowing or  occlusion in the head or    neck.    2. Incidental right thyroid nodule, recommend comparison with prior    outside imaging or nonemergent follow-up ultrasound for further    characterization.          MACRO:    None          Signed by: Ramez Lane 2/11/2024 4:15 PM    Dictation workstation:   [id number]     XR chest 1 view   Final Result    NO ACUTE DISEASE IN THE CHEST          MACRO:    None          Signed by: Tomer Blanchard 2/11/2024 2:32 PM    Dictation workstation:   [id number]     CT head wo IV contrast   Final Result    No acute intracranial pathology.          MACRO:    None          Signed by: Jessy Perkins 2/11/2024 1:52 PM    Dictation workstation:   [id number]     Spoke with neurology on [facility]  for patient to stay here advised to get the MRI/MRA tomorrow, advised to obtain a myasthenia gravis panel,     They have this at his follow-up appointment        Amount and/or Complexity of Data Reviewed  ECG/medicine tests: independent interpretation performed.     Details: EKG done at 1:04 PM shows sinus rhythm rate of 98 Axis normal  QRS 82 QT/QTc 364/462  EKGs to compare to but no acute ST changes noted on the EKG        Procedure  Procedures     Lelia Oquendo PA-C  02/11/24 1314       Lelia Oquendo PA-C  02/11/24 8202

## 2024-02-12 ENCOUNTER — APPOINTMENT (OUTPATIENT)
Dept: CARDIOLOGY | Facility: HOSPITAL | Age: 64
DRG: 065 | End: 2024-02-12
Payer: COMMERCIAL

## 2024-02-12 ENCOUNTER — APPOINTMENT (OUTPATIENT)
Dept: RADIOLOGY | Facility: HOSPITAL | Age: 64
DRG: 065 | End: 2024-02-12
Payer: COMMERCIAL

## 2024-02-12 LAB
ALBUMIN SERPL BCP-MCNC: 3.8 G/DL (ref 3.4–5)
ALP SERPL-CCNC: 19 U/L (ref 33–136)
ALT SERPL W P-5'-P-CCNC: 15 U/L (ref 10–52)
AMMONIA PLAS-SCNC: 30 UMOL/L (ref 16–53)
ANION GAP SERPL CALC-SCNC: 10 MMOL/L (ref 10–20)
AST SERPL W P-5'-P-CCNC: 14 U/L (ref 9–39)
ATRIAL RATE: 98 BPM
BILIRUB DIRECT SERPL-MCNC: 0 MG/DL (ref 0–0.3)
BILIRUB SERPL-MCNC: 0.5 MG/DL (ref 0–1.2)
BUN SERPL-MCNC: 14 MG/DL (ref 6–23)
CALCIUM SERPL-MCNC: 9.2 MG/DL (ref 8.6–10.3)
CHLORIDE SERPL-SCNC: 110 MMOL/L (ref 98–107)
CHOLEST SERPL-MCNC: 199 MG/DL (ref 0–199)
CHOLESTEROL/HDL RATIO: 5.1
CO2 SERPL-SCNC: 23 MMOL/L (ref 21–32)
CREAT SERPL-MCNC: 0.84 MG/DL (ref 0.5–1.3)
EGFRCR SERPLBLD CKD-EPI 2021: >90 ML/MIN/1.73M*2
ERYTHROCYTE [DISTWIDTH] IN BLOOD BY AUTOMATED COUNT: 13 % (ref 11.5–14.5)
EST. AVERAGE GLUCOSE BLD GHB EST-MCNC: 103 MG/DL
EST. AVERAGE GLUCOSE BLD GHB EST-MCNC: 105 MG/DL
GLUCOSE SERPL-MCNC: 80 MG/DL (ref 74–99)
HBA1C MFR BLD: 5.2 %
HBA1C MFR BLD: 5.3 %
HCT VFR BLD AUTO: 44.9 % (ref 41–52)
HDLC SERPL-MCNC: 38.8 MG/DL
HGB BLD-MCNC: 14.8 G/DL (ref 13.5–17.5)
LDLC SERPL CALC-MCNC: 136 MG/DL
MCH RBC QN AUTO: 31.4 PG (ref 26–34)
MCHC RBC AUTO-ENTMCNC: 33 G/DL (ref 32–36)
MCV RBC AUTO: 95 FL (ref 80–100)
NON HDL CHOLESTEROL: 160 MG/DL (ref 0–149)
NRBC BLD-RTO: 0 /100 WBCS (ref 0–0)
P AXIS: 75 DEGREES
P OFFSET: 215 MS
P ONSET: 162 MS
PLATELET # BLD AUTO: 207 X10*3/UL (ref 150–450)
POTASSIUM SERPL-SCNC: 3.7 MMOL/L (ref 3.5–5.3)
PR INTERVAL: 126 MS
PROT SERPL-MCNC: 5.8 G/DL (ref 6.4–8.2)
Q ONSET: 225 MS
QRS COUNT: 16 BEATS
QRS DURATION: 82 MS
QT INTERVAL: 362 MS
QTC CALCULATION(BAZETT): 462 MS
QTC FREDERICIA: 426 MS
R AXIS: 51 DEGREES
RBC # BLD AUTO: 4.72 X10*6/UL (ref 4.5–5.9)
SODIUM SERPL-SCNC: 139 MMOL/L (ref 136–145)
T AXIS: 84 DEGREES
T OFFSET: 406 MS
TRIGL SERPL-MCNC: 122 MG/DL (ref 0–149)
VENTRICULAR RATE: 98 BPM
VLDL: 24 MG/DL (ref 0–40)
WBC # BLD AUTO: 6.4 X10*3/UL (ref 4.4–11.3)

## 2024-02-12 PROCEDURE — 99233 SBSQ HOSP IP/OBS HIGH 50: CPT

## 2024-02-12 PROCEDURE — 85027 COMPLETE CBC AUTOMATED: CPT | Performed by: NURSE PRACTITIONER

## 2024-02-12 PROCEDURE — 83036 HEMOGLOBIN GLYCOSYLATED A1C: CPT | Mod: GENLAB,MUE

## 2024-02-12 PROCEDURE — 2500000001 HC RX 250 WO HCPCS SELF ADMINISTERED DRUGS (ALT 637 FOR MEDICARE OP): Mod: IPSPLIT

## 2024-02-12 PROCEDURE — 99222 1ST HOSP IP/OBS MODERATE 55: CPT | Performed by: NURSE PRACTITIONER

## 2024-02-12 PROCEDURE — 2500000001 HC RX 250 WO HCPCS SELF ADMINISTERED DRUGS (ALT 637 FOR MEDICARE OP): Performed by: PHYSICIAN ASSISTANT

## 2024-02-12 PROCEDURE — 82140 ASSAY OF AMMONIA: CPT | Performed by: NURSE PRACTITIONER

## 2024-02-12 PROCEDURE — 76536 US EXAM OF HEAD AND NECK: CPT

## 2024-02-12 PROCEDURE — 36415 COLL VENOUS BLD VENIPUNCTURE: CPT | Performed by: NURSE PRACTITIONER

## 2024-02-12 PROCEDURE — 2500000002 HC RX 250 W HCPCS SELF ADMINISTERED DRUGS (ALT 637 FOR MEDICARE OP, ALT 636 FOR OP/ED): Performed by: NURSE PRACTITIONER

## 2024-02-12 PROCEDURE — 94640 AIRWAY INHALATION TREATMENT: CPT

## 2024-02-12 PROCEDURE — 93306 TTE W/DOPPLER COMPLETE: CPT

## 2024-02-12 PROCEDURE — 1200000002 HC GENERAL ROOM WITH TELEMETRY DAILY: Mod: IPSPLIT

## 2024-02-12 PROCEDURE — 70551 MRI BRAIN STEM W/O DYE: CPT | Performed by: RADIOLOGY

## 2024-02-12 PROCEDURE — 9420000001 HC RT PATIENT EDUCATION 5 MIN

## 2024-02-12 PROCEDURE — 70551 MRI BRAIN STEM W/O DYE: CPT

## 2024-02-12 PROCEDURE — 76536 US EXAM OF HEAD AND NECK: CPT | Performed by: STUDENT IN AN ORGANIZED HEALTH CARE EDUCATION/TRAINING PROGRAM

## 2024-02-12 PROCEDURE — 2500000002 HC RX 250 W HCPCS SELF ADMINISTERED DRUGS (ALT 637 FOR MEDICARE OP, ALT 636 FOR OP/ED): Mod: IPSPLIT

## 2024-02-12 PROCEDURE — 94664 DEMO&/EVAL PT USE INHALER: CPT

## 2024-02-12 PROCEDURE — 94667 MNPJ CHEST WALL 1ST: CPT

## 2024-02-12 PROCEDURE — 80053 COMPREHEN METABOLIC PANEL: CPT | Performed by: NURSE PRACTITIONER

## 2024-02-12 PROCEDURE — 82248 BILIRUBIN DIRECT: CPT | Performed by: NURSE PRACTITIONER

## 2024-02-12 PROCEDURE — S4991 NICOTINE PATCH NONLEGEND: HCPCS | Performed by: NURSE PRACTITIONER

## 2024-02-12 PROCEDURE — 2500000004 HC RX 250 GENERAL PHARMACY W/ HCPCS (ALT 636 FOR OP/ED): Mod: IPSPLIT | Performed by: NURSE PRACTITIONER

## 2024-02-12 PROCEDURE — 2500000004 HC RX 250 GENERAL PHARMACY W/ HCPCS (ALT 636 FOR OP/ED): Mod: IPSPLIT

## 2024-02-12 PROCEDURE — 2500000001 HC RX 250 WO HCPCS SELF ADMINISTERED DRUGS (ALT 637 FOR MEDICARE OP): Performed by: NURSE PRACTITIONER

## 2024-02-12 PROCEDURE — 93306 TTE W/DOPPLER COMPLETE: CPT | Performed by: INTERNAL MEDICINE

## 2024-02-12 PROCEDURE — 80061 LIPID PANEL: CPT

## 2024-02-12 PROCEDURE — 83036 HEMOGLOBIN GLYCOSYLATED A1C: CPT | Mod: GENLAB | Performed by: NURSE PRACTITIONER

## 2024-02-12 RX ORDER — GUAIFENESIN 600 MG/1
600 TABLET, EXTENDED RELEASE ORAL 2 TIMES DAILY
Status: DISCONTINUED | OUTPATIENT
Start: 2024-02-12 | End: 2024-02-13 | Stop reason: HOSPADM

## 2024-02-12 RX ORDER — ATORVASTATIN CALCIUM 40 MG/1
40 TABLET, FILM COATED ORAL NIGHTLY
Status: DISCONTINUED | OUTPATIENT
Start: 2024-02-12 | End: 2024-02-13 | Stop reason: HOSPADM

## 2024-02-12 RX ORDER — FLUTICASONE PROPIONATE 50 MCG
2 SPRAY, SUSPENSION (ML) NASAL DAILY
Status: DISCONTINUED | OUTPATIENT
Start: 2024-02-12 | End: 2024-02-13 | Stop reason: HOSPADM

## 2024-02-12 RX ORDER — CLOPIDOGREL BISULFATE 75 MG/1
75 TABLET ORAL DAILY
Status: DISCONTINUED | OUTPATIENT
Start: 2024-02-12 | End: 2024-02-13 | Stop reason: HOSPADM

## 2024-02-12 RX ADMIN — METOPROLOL SUCCINATE 200 MG: 100 TABLET, EXTENDED RELEASE ORAL at 09:03

## 2024-02-12 RX ADMIN — NICOTINE 1 PATCH: 14 PATCH, EXTENDED RELEASE TRANSDERMAL at 09:04

## 2024-02-12 RX ADMIN — GUAIFENESIN 600 MG: 600 TABLET, EXTENDED RELEASE ORAL at 20:16

## 2024-02-12 RX ADMIN — TIOTROPIUM BROMIDE INHALATION SPRAY 2 PUFF: 3.12 SPRAY, METERED RESPIRATORY (INHALATION) at 11:23

## 2024-02-12 RX ADMIN — ATORVASTATIN CALCIUM 40 MG: 40 TABLET, FILM COATED ORAL at 20:16

## 2024-02-12 RX ADMIN — ENOXAPARIN SODIUM 40 MG: 40 INJECTION SUBCUTANEOUS at 20:16

## 2024-02-12 RX ADMIN — FAMOTIDINE 20 MG: 20 TABLET, FILM COATED ORAL at 09:03

## 2024-02-12 RX ADMIN — FLUTICASONE FUROATE AND VILANTEROL TRIFENATATE 1 PUFF: 200; 25 POWDER RESPIRATORY (INHALATION) at 11:22

## 2024-02-12 RX ADMIN — FAMOTIDINE 20 MG: 20 TABLET, FILM COATED ORAL at 20:16

## 2024-02-12 RX ADMIN — FLUTICASONE PROPIONATE 2 SPRAY: 50 SPRAY, METERED NASAL at 16:39

## 2024-02-12 RX ADMIN — CLOPIDOGREL BISULFATE 75 MG: 75 TABLET ORAL at 16:39

## 2024-02-12 SDOH — ECONOMIC STABILITY: HOUSING INSECURITY
IN THE LAST 12 MONTHS, WAS THERE A TIME WHEN YOU DID NOT HAVE A STEADY PLACE TO SLEEP OR SLEPT IN A SHELTER (INCLUDING NOW)?: NO

## 2024-02-12 SDOH — ECONOMIC STABILITY: FOOD INSECURITY: WITHIN THE PAST 12 MONTHS, THE FOOD YOU BOUGHT JUST DIDN'T LAST AND YOU DIDN'T HAVE MONEY TO GET MORE.: NEVER TRUE

## 2024-02-12 SDOH — ECONOMIC STABILITY: HOUSING INSECURITY: IN THE LAST 12 MONTHS, HOW MANY PLACES HAVE YOU LIVED?: 1

## 2024-02-12 SDOH — ECONOMIC STABILITY: INCOME INSECURITY: IN THE LAST 12 MONTHS, WAS THERE A TIME WHEN YOU WERE NOT ABLE TO PAY THE MORTGAGE OR RENT ON TIME?: NO

## 2024-02-12 SDOH — ECONOMIC STABILITY: INCOME INSECURITY: IN THE PAST 12 MONTHS, HAS THE ELECTRIC, GAS, OIL, OR WATER COMPANY THREATENED TO SHUT OFF SERVICE IN YOUR HOME?: NO

## 2024-02-12 SDOH — ECONOMIC STABILITY: INCOME INSECURITY: HOW HARD IS IT FOR YOU TO PAY FOR THE VERY BASICS LIKE FOOD, HOUSING, MEDICAL CARE, AND HEATING?: NOT VERY HARD

## 2024-02-12 SDOH — ECONOMIC STABILITY: TRANSPORTATION INSECURITY
IN THE PAST 12 MONTHS, HAS THE LACK OF TRANSPORTATION KEPT YOU FROM MEDICAL APPOINTMENTS OR FROM GETTING MEDICATIONS?: NO

## 2024-02-12 SDOH — ECONOMIC STABILITY: FOOD INSECURITY: WITHIN THE PAST 12 MONTHS, YOU WORRIED THAT YOUR FOOD WOULD RUN OUT BEFORE YOU GOT MONEY TO BUY MORE.: NEVER TRUE

## 2024-02-12 SDOH — ECONOMIC STABILITY: TRANSPORTATION INSECURITY
IN THE PAST 12 MONTHS, HAS LACK OF TRANSPORTATION KEPT YOU FROM MEETINGS, WORK, OR FROM GETTING THINGS NEEDED FOR DAILY LIVING?: NO

## 2024-02-12 ASSESSMENT — PAIN SCALES - GENERAL
PAINLEVEL_OUTOF10: 0 - NO PAIN

## 2024-02-12 ASSESSMENT — PAIN - FUNCTIONAL ASSESSMENT
PAIN_FUNCTIONAL_ASSESSMENT: 0-10
PAIN_FUNCTIONAL_ASSESSMENT: 0-10

## 2024-02-12 NOTE — DISCHARGE INSTR - OTHER ORDERS
Thank you for choosing [facility] for your Health Care needs.  Also, thank you for allowing us to take you and your families preferences into account when determining your discharge plan.  Stay well!    Your Care Transitions Team Member: Clare Sepulveda Amanda or Robin [phone]         Your outpatient resources for alcohol dependence are: Alcoholics Anonymous [phone],  [facility] [phone], [facility] [phone]; [facility] [phone] (24 hour hotline), [facility] (Medicaid only) [phone],  [phone].     For questions about medications listed on your discharge instructions, please call the Nurses Station at [phone].

## 2024-02-12 NOTE — PROGRESS NOTES
"Andrez Flores is a 63 y.o. male on day 0 of admission presenting with Dizziness.      Subjective   Pt assessed at bedside. Neuro exam unremarkable. Pt states that the double vision has improved but \"sometimes\" he still has it.        Objective     Last Recorded Vitals  BP (!) 158/99 (BP Location: Left arm, Patient Position: Lying)   Pulse 65   Temp 36.3 °C (97.3 °F) (Temporal)   Resp 18   Wt 80 kg (176 lb 5.9 oz)   SpO2 98%   Intake/Output last 3 Shifts:    Intake/Output Summary (Last 24 hours) at 2/12/2024 1235  Last data filed at 2/12/2024 0635  Gross per 24 hour   Intake 2420 ml   Output --   Net 2420 ml       Admission Weight  Weight: 81.6 kg (180 lb) (02/11/24 1253)    Daily Weight  02/11/24 : 80 kg (176 lb 5.9 oz)    Image Results  US thyroid  Narrative: Interpreted By:  Tavares Larsen,   STUDY:  US THYROID;  2/12/2024 10:32 am      INDICATION:  Signs/Symptoms:CT of Head showed Incidental right thyroid nodule,  recommend comparison with prior outside imaging or nonemergent  follow-up ultrasound for further.      COMPARISON:  None.      ACCESSION NUMBER(S):  [id number]      ORDERING CLINICIAN:  BOB FORDE      TECHNIQUE:  Multiple ultrasonographic images of the thyroid gland were obtained.  Representative images were submitted for interpretation.      FINDINGS:          RIGHT LOBE:  Measures 5.2 x 2.8 x 3.1 cm. Slightly heterogenous echogenicity.      Nodule 1:  Location: Right mid.  Size: 2.2 x 2.1 x 1.8 cm.  Composition: Almost completely solid (2)  Echogenicity: Isoechoic (1).  Shape: Wider than taller (0).  Margins: Smooth (0).  Echogenic foci: None.      Total points: 3. TI-RADS category: 3.      LEFT LOBE:  Measures 4.2 x 2.1 x 1.6 cm. Slightly heterogenous echogenicity.      Nodule 2:  Location: Left inferior.  Size: 1.2 x 1.0 cm  Composition: Almost completely solid (2)  Echogenicity: Isoechoic (1).  Shape: Taller than wider (3).  Margins: Smooth (0).  Echogenic foci: None.      Total points: 6. " TI-RADS category: 4.      ISTHMUS:  Measures 0.5 cm.      Impression: 1. Right mid TI-RADS category 3 thyroid nodule measuring 2.2 cm. Per  TI-RADS criteria and given size advise follow-up in 1, 3, 5 years.  2. Left inferior TI-RADS category 4 thyroid nodule measuring 1.2 cm.  Per TI-RADS criteria and given size advise follow-up in 1, 2, 3, 5  years  3. Slightly heterogenous thyroid echotexture, nonspecific, however  could be related to underlying chronic thyroid disease. Advise  correlation with thyroid function tests.      Signed by: Tavares Larsen 2/12/2024 11:55 AM  Dictation workstation:   [id number]  ECG 12 lead  Normal sinus rhythm  Normal ECG  No previous ECGs available      Physical Exam  HENT:      Head: Normocephalic.      Nose: Nose normal.      Mouth/Throat:      Pharynx: Oropharynx is clear.   Eyes:      Conjunctiva/sclera: Conjunctivae normal.   Cardiovascular:      Rate and Rhythm: Normal rate and regular rhythm.   Pulmonary:      Breath sounds: Decreased breath sounds present.   Abdominal:      General: Bowel sounds are normal.      Palpations: Abdomen is soft.   Musculoskeletal:         General: Normal range of motion.      Cervical back: Normal range of motion and neck supple.   Skin:     General: Skin is dry.   Neurological:      General: No focal deficit present.      Mental Status: He is alert and oriented to person, place, and time.      Comments: 1A: LOC  -0 Alert; Keenly responsive    1B: Ask month and age  0 Both questions right     1C: Blink eyes and squeeze hands  0 performs both tasks    2: Horizontal EOM  0 normal    3: Visual Fields  0 no visual loss    4: Facial Palsy  0 normal symmetry    5A: L arm Motor drift  0 no drift after 10 seconds    5B: R Arm motor drift   0 no drift after 10 seconds    6A: L leg motor drift   0 no drift after 5 seconds    6B: R Leg motor drift  0 no drift after 5 seconds    7: Limb ataxia (FNF; heel-shin)  0 no ataxia    8: Sensation  0 Normal no sensory  loss    9: Language/Aphasia  0 normal, no aphasia    10: Dysarthria   0 Normal    11: Extinction/Inattention  0 No abnormality      Total: 0     Psychiatric:         Mood and Affect: Mood normal.         Behavior: Behavior normal.         Relevant Results  Scheduled medications  enoxaparin, 40 mg, subcutaneous, q24h  famotidine, 20 mg, oral, BID   Or  famotidine, 20 mg, intravenous, BID  fluticasone, 2 spray, Each Nostril, Daily  tiotropium, 2 Inhalation, inhalation, Daily   And  fluticasone furoate-vilanteroL, 1 puff, inhalation, Daily  guaiFENesin, 600 mg, oral, BID  metoprolol succinate XL, 200 mg, oral, Daily  nicotine, 1 patch, transdermal, Daily      Continuous medications  [Held by provider] sodium chloride 0.9%, 100 mL/hr, Last Rate: 100 mL/hr (02/12/24 0553)      PRN medications  PRN medications: acetaminophen **OR** acetaminophen **OR** acetaminophen, albuterol, melatonin, ondansetron **OR** ondansetron    Results for orders placed or performed during the hospital encounter of 02/11/24 (from the past 24 hour(s))   ECG 12 lead   Result Value Ref Range    Ventricular Rate 98 BPM    Atrial Rate 98 BPM    TN Interval 126 ms    QRS Duration 82 ms    QT Interval 362 ms    QTC Calculation(Bazett) 462 ms    P Axis 75 degrees    R Axis 51 degrees    T Axis 84 degrees    QRS Count 16 beats    Q Onset 225 ms    P Onset 162 ms    P Offset 215 ms    T Offset 406 ms    QTC Fredericia 426 ms   CBC and Auto Differential   Result Value Ref Range    WBC 8.1 4.4 - 11.3 x10*3/uL    nRBC 0.0 0.0 - 0.0 /100 WBCs    RBC 5.24 4.50 - 5.90 x10*6/uL    Hemoglobin 16.7 13.5 - 17.5 g/dL    Hematocrit 49.6 41.0 - 52.0 %    MCV 95 80 - 100 fL    MCH 31.9 26.0 - 34.0 pg    MCHC 33.7 32.0 - 36.0 g/dL    RDW 13.1 11.5 - 14.5 %    Platelets 233 150 - 450 x10*3/uL    Neutrophils % 59.7 40.0 - 80.0 %    Immature Granulocytes %, Automated 0.5 0.0 - 0.9 %    Lymphocytes % 30.9 13.0 - 44.0 %    Monocytes % 6.9 2.0 - 10.0 %    Eosinophils % 0.9  0.0 - 6.0 %    Basophils % 1.1 0.0 - 2.0 %    Neutrophils Absolute 4.86 1.20 - 7.70 x10*3/uL    Immature Granulocytes Absolute, Automated 0.04 0.00 - 0.70 x10*3/uL    Lymphocytes Absolute 2.51 1.20 - 4.80 x10*3/uL    Monocytes Absolute 0.56 0.10 - 1.00 x10*3/uL    Eosinophils Absolute 0.07 0.00 - 0.70 x10*3/uL    Basophils Absolute 0.09 0.00 - 0.10 x10*3/uL   Magnesium   Result Value Ref Range    Magnesium 2.01 1.60 - 2.40 mg/dL   Comprehensive metabolic panel   Result Value Ref Range    Glucose 105 (H) 74 - 99 mg/dL    Sodium 139 136 - 145 mmol/L    Potassium 4.0 3.5 - 5.3 mmol/L    Chloride 105 98 - 107 mmol/L    Bicarbonate 24 21 - 32 mmol/L    Anion Gap 14 10 - 20 mmol/L    Urea Nitrogen 16 6 - 23 mg/dL    Creatinine 0.95 0.50 - 1.30 mg/dL    eGFR 89 >60 mL/min/1.73m*2    Calcium 10.1 8.6 - 10.3 mg/dL    Albumin 4.4 3.4 - 5.0 g/dL    Alkaline Phosphatase 24 (L) 33 - 136 U/L    Total Protein 7.3 6.4 - 8.2 g/dL    AST 15 9 - 39 U/L    Bilirubin, Total 0.5 0.0 - 1.2 mg/dL    ALT 18 10 - 52 U/L   Troponin I, High Sensitivity   Result Value Ref Range    Troponin I, High Sensitivity 5 0 - 20 ng/L   Protime-INR   Result Value Ref Range    Protime 11.9 9.8 - 12.8 seconds    INR 1.1 0.9 - 1.1   aPTT   Result Value Ref Range    aPTT 36 27 - 38 seconds   Ethanol   Result Value Ref Range    Alcohol <10 <=10 mg/dL   TSH with reflex to Free T4 if abnormal   Result Value Ref Range    Thyroid Stimulating Hormone 1.44 0.44 - 3.98 mIU/L   Sars-CoV-2 and Influenza A/B PCR   Result Value Ref Range    Flu A Result Not Detected Not Detected    Flu B Result Not Detected Not Detected    Coronavirus 2019, PCR Not Detected Not Detected   Urinalysis with Reflex Microscopic   Result Value Ref Range    Color, Urine Yellow Straw, Yellow    Appearance, Urine Clear Clear    Specific Gravity, Urine 1.011 1.005 - 1.035    pH, Urine 6.0 5.0, 5.5, 6.0, 6.5, 7.0, 7.5, 8.0    Protein, Urine NEGATIVE NEGATIVE mg/dL    Glucose, Urine NEGATIVE NEGATIVE  mg/dL    Blood, Urine SMALL (1+) (A) NEGATIVE    Ketones, Urine 5 (TRACE) (A) NEGATIVE mg/dL    Bilirubin, Urine NEGATIVE NEGATIVE    Urobilinogen, Urine <2.0 <2.0 mg/dL    Nitrite, Urine NEGATIVE NEGATIVE    Leukocyte Esterase, Urine NEGATIVE NEGATIVE   Drug Screen, Urine   Result Value Ref Range    Amphetamine Screen, Urine Presumptive Negative Presumptive Negative    Barbiturate Screen, Urine Presumptive Negative Presumptive Negative    Benzodiazepines Screen, Urine Presumptive Negative Presumptive Negative    Cannabinoid Screen, Urine Presumptive Negative Presumptive Negative    Cocaine Metabolite Screen, Urine Presumptive Negative Presumptive Negative    Fentanyl Screen, Urine Presumptive Negative Presumptive Negative    Opiate Screen, Urine Presumptive Negative Presumptive Negative    Oxycodone Screen, Urine Presumptive Negative Presumptive Negative    PCP Screen, Urine Presumptive Negative Presumptive Negative   Microscopic Only, Urine   Result Value Ref Range    WBC, Urine 1-5 1-5, NONE /HPF    RBC, Urine 1-2 NONE, 1-2, 3-5 /HPF    Mucus, Urine FEW Reference range not established. /LPF   Ammonia   Result Value Ref Range    Ammonia 30 16 - 53 umol/L   Hepatic function panel   Result Value Ref Range    Albumin 3.8 3.4 - 5.0 g/dL    Bilirubin, Total 0.5 0.0 - 1.2 mg/dL    Bilirubin, Direct 0.0 0.0 - 0.3 mg/dL    Alkaline Phosphatase 19 (L) 33 - 136 U/L    ALT 15 10 - 52 U/L    AST 14 9 - 39 U/L    Total Protein 5.8 (L) 6.4 - 8.2 g/dL   Hemoglobin A1C   Result Value Ref Range    Hemoglobin A1C 5.3 see below %    Estimated Average Glucose 105 Not Established mg/dL   CBC   Result Value Ref Range    WBC 6.4 4.4 - 11.3 x10*3/uL    nRBC 0.0 0.0 - 0.0 /100 WBCs    RBC 4.72 4.50 - 5.90 x10*6/uL    Hemoglobin 14.8 13.5 - 17.5 g/dL    Hematocrit 44.9 41.0 - 52.0 %    MCV 95 80 - 100 fL    MCH 31.4 26.0 - 34.0 pg    MCHC 33.0 32.0 - 36.0 g/dL    RDW 13.0 11.5 - 14.5 %    Platelets 207 150 - 450 x10*3/uL   Basic metabolic  panel   Result Value Ref Range    Glucose 80 74 - 99 mg/dL    Sodium 139 136 - 145 mmol/L    Potassium 3.7 3.5 - 5.3 mmol/L    Chloride 110 (H) 98 - 107 mmol/L    Bicarbonate 23 21 - 32 mmol/L    Anion Gap 10 10 - 20 mmol/L    Urea Nitrogen 14 6 - 23 mg/dL    Creatinine 0.84 0.50 - 1.30 mg/dL    eGFR >90 >60 mL/min/1.73m*2    Calcium 9.2 8.6 - 10.3 mg/dL   Lipid panel   Result Value Ref Range    Cholesterol 199 0 - 199 mg/dL    HDL-Cholesterol 38.8 mg/dL    Cholesterol/HDL Ratio 5.1     LDL Calculated 136 (H) <=99 mg/dL    VLDL 24 0 - 40 mg/dL    Triglycerides 122 0 - 149 mg/dL    Non HDL Cholesterol 160 (H) 0 - 149 mg/dL   Transthoracic Echo (TTE) Complete   Result Value Ref Range    BSA 1.97 m2          Assessment/Plan        Principal Problem:    Dizziness    #Dizziness  #Acute diplopia  #CVA rule out   #Hypertension  -CT head: 1. Limited exam, no high-grade narrowing or occlusion in the head or  neck.  2. Incidental right thyroid nodule, recommend comparison with prior  outside imaging or nonemergent follow-up ultrasound for further  characterization.  -MRI brain pending  -US thyroid: 1. Right mid TI-RADS category 3 thyroid nodule measuring 2.2 cm. Per  TI-RADS criteria and given size advise follow-up in 1, 3, 5 years.  2. Left inferior TI-RADS category 4 thyroid nodule measuring 1.2 cm.  Per TI-RADS criteria and given size advise follow-up in 1, 2, 3, 5  years  3. Slightly heterogenous thyroid echotexture, nonspecific, however  could be related to underlying chronic thyroid disease. Advise  correlation with thyroid function tests.  -Lipid panel: Nini 199, HDL 38.8, , Trig 122  -Hgb A1C pending  -Echo pending  -Cardiac monitoring  -Cardiology consult, appreciate recs   -q4 neuro checks  -q4 vital signs  -Continue home nebivolol which is substituted for 200mg metoprolol XL inpatient  -Cardiology to adjust medications after MRI results, allow permissive HTN until CVA ruled out     #COPD, not in acute  exacerbation  #Tobacco use disorder  #ETOH use disorder  -Encourage cessation  -Nicotine patch   -Continue mucinex, spiriva, breo, flonase    DVT ppx  -lovenox    F: PRN  E: Replete per protocol  N: Regular  A: PIV    Disposition: Pt requires less than 2 inpatient days at this time   Code Status: Full Code    Total accumulated time spent face to face and not face to face preparing to see the patient, obtaining and reviewing separately obtained history; performing a medically appropriate examination and/or evaluation; counseling and educating the patient, family; ordering medications, tests, or procedures; referring and communicating with other health care professionals; documenting clinical information in the patient's medical record; independently interpreting results and communicating the results to the patient, family; and care coordination was 45 minutes.           JORGE Weston-CNP

## 2024-02-12 NOTE — H&P
"                     History and Physical         Andrez Flores 63 y.o. 1960     History Of Present Illness  Andrez Flores is a 63 y.o. male presented to [facility] ED from home.  Chief Complaint of Dizziness Acute Diplopia Chronic Headache.  He reported this morning he woke up and was very dizzy and had double vision. He reports daily chronic headache.  CT of Head Limited exam, no high-grade narrowing or occlusion in the head or neck No acute intracranial pathology. CXR  No acute disease of the chest.  Patient reports he \"cut back last month\" to 4 beers per day  Drug Screen Negative Patient endorses mild anxiety Hepatic Panel Ordered Ammonia Level Order Ordered Valium 5 mg po x 1 NOW. On exam patient resting in bed. Alert x 4. Patient states if  he opens his left eye he has double vision, nausea and dizziness. On exam patient left eye nystagmus noted.   Pupils Equal. Bilateral Hand  Equal. Patient having difficulty touching  his nose with  his eyes closed. Patient c/o headache. Current smoker 1ppd Requests Nicoderm Patch Denies Chest Pain, N/V/D SOB        Past Medical History  Past Medical History:   Diagnosis Date    Hypertension         Surgical History  He has a past surgical history that includes XR knee (Left, 2018).     Social History  Social History     Socioeconomic History    Marital status:      Spouse name: new,antonio    Number of children: 2    Years of education: Not on file    Highest education level: Not on file   Occupational History    Occupation: retired   Tobacco Use    Smoking status: Every Day     Packs/day: 1.00     Years: 15.00     Additional pack years: 0.00     Total pack years: 15.00     Types: Cigarettes     Passive exposure: Current    Smokeless tobacco: Never   Vaping Use    Vaping Use: Never used   Substance and Sexual Activity    Alcohol use: Yes     Alcohol/week: 28.0 standard drinks of alcohol     Types: 28 Cans of beer per week     Comment: 4 beers a day    Drug use: Never "    Sexual activity: Not on file   Other Topics Concern    Not on file   Social History Narrative    Not on file     Social Determinants of Health     Financial Resource Strain: Low Risk  (2/11/2024)    Overall Financial Resource Strain (CARDIA)     Difficulty of Paying Living Expenses: Not very hard   Food Insecurity: Not on file   Transportation Needs: No Transportation Needs (2/11/2024)    PRAPARE - Transportation     Lack of Transportation (Medical): No     Lack of Transportation (Non-Medical): No   Physical Activity: Not on file   Stress: Not on file   Social Connections: Not on file   Intimate Partner Violence: Not on file   Housing Stability: Low Risk  (2/11/2024)    Housing Stability Vital Sign     Unable to Pay for Housing in the Last Year: No     Number of Places Lived in the Last Year: 1     Unstable Housing in the Last Year: No        Family History  Family History   Problem Relation Name Age of Onset    Hypothyroidism Mother      Other (mva) Father      Heart attack Daughter      Parkinsonism Maternal Grandfather          Allergies  Allergies   Allergen Reactions    Aspirin Unknown    Naproxen Unknown        Vital Signs  Temp:  [36.4 °C (97.6 °F)-36.5 °C (97.7 °F)] 36.5 °C (97.7 °F)  Heart Rate:  [] 71  Resp:  [14-18] 18  BP: (137-197)/() 191/97    Home Medications   Prior to Admission Medications   Prescriptions Last Dose Informant Patient Reported? Taking?   albuterol 0.63 mg/3 mL nebulizer solution   Yes No   Sig: Take 3 mL (0.63 mg) by nebulization every 6 hours if needed for wheezing.   fluticasone-umeclidin-vilanter (Trelegy Ellipta) 200-62.5-25 mcg blister with device 2/9/2024  Yes No   Sig: Inhale 1 puff once every 24 hours.   nebivolol (Bystolic) 20 mg tablet 2/9/2024  Yes No   Sig: Take 1 tablet (20 mg) by mouth once daily.      Facility-Administered Medications: None       New Hospital Orders  Current Facility-Administered Medications   Medication Dose Route Frequency Provider Last  Rate Last Admin    acetaminophen (Tylenol) tablet 650 mg  650 mg oral q4h PRN DONG Jorgensen        Or    acetaminophen (Tylenol) oral liquid 650 mg  650 mg nasogastric tube q4h PRN DONG Jorgensen        Or    acetaminophen (Tylenol) suppository 650 mg  650 mg rectal q4h PRN DONG Jorgensen        albuterol 2.5 mg /3 mL (0.083 %) nebulizer solution 0.63 mg  0.63 mg nebulization q6h PRN DONG Jorgensen        enoxaparin (Lovenox) syringe 40 mg  40 mg subcutaneous q24h DONG Jorgensen        famotidine (Pepcid) tablet 20 mg  20 mg oral BID DONG Jorgensen        Or    famotidine PF (Pepcid) injection 20 mg  20 mg intravenous BID DONG Jorgensen        [START ON 2/12/2024] tiotropium (Spiriva Respimat) 2.5 mcg/actuation inhaler 2 puff  2 Inhalation inhalation Daily DONG Jorgensen        And    [START ON 2/12/2024] fluticasone furoate-vilanteroL (Breo Ellipta) 200-25 mcg/dose inhaler 1 puff  1 puff inhalation Daily DONG Jorgensen        melatonin tablet 3 mg  3 mg oral Nightly PRN DONG Jorgensen        metoprolol succinate XL (Toprol-XL) 24 hr tablet 200 mg  200 mg oral Daily Lelia Oquendo PA-C   200 mg at 02/11/24 1550    nicotine (Nicoderm CQ) 14 mg/24 hr patch 1 patch  1 patch transdermal Daily DONG Jorgensen        ondansetron (Zofran) tablet 4 mg  4 mg oral q8h PRN DONG Jorgensen        Or    ondansetron (Zofran) injection 4 mg  4 mg intravenous q8h PRN DONG Jorgensen        sodium chloride 0.9% infusion  100 mL/hr intravenous Continuous DONG Jorgensen               Review of Systems   Constitutional:  Positive for activity change.   Eyes:  Positive for visual disturbance.        Diplopia    Respiratory: Negative.     Cardiovascular: Negative.    Gastrointestinal: Negative.    Endocrine: Negative.    Genitourinary: Negative.    Musculoskeletal: Negative.    Neurological:  Positive for dizziness  "and headaches.        Diplopia    Hematological: Negative.    Psychiatric/Behavioral:          Anxious            Physical Exam  Constitutional:       Appearance: He is ill-appearing.   HENT:      Head: Normocephalic and atraumatic.   Eyes:      Comments: Left Eye Nystagmus noted    Cardiovascular:      Rate and Rhythm: Normal rate and regular rhythm.      Pulses: Normal pulses.      Heart sounds: Normal heart sounds.   Pulmonary:      Effort: Pulmonary effort is normal.      Breath sounds: Normal breath sounds.   Abdominal:      General: Abdomen is flat. Bowel sounds are normal.      Palpations: Abdomen is soft.   Musculoskeletal:         General: Normal range of motion.      Cervical back: Normal range of motion and neck supple.   Skin:     General: Skin is warm and dry.   Neurological:      Mental Status: He is alert.      Cranial Nerves: Cranial nerve deficit present.      Motor: Weakness present.      Gait: Gait abnormal.   Psychiatric:      Comments: Anxious             Last Recorded Vitals  Blood pressure (!) 191/97, pulse 71, temperature 36.5 °C (97.7 °F), temperature source Oral, resp. rate 18, height 1.753 m (5' 9\"), weight 80 kg (176 lb 5.9 oz), SpO2 97 %.    Relevant Results  Results for orders placed or performed during the hospital encounter of 02/11/24   CBC and Auto Differential   Result Value Ref Range    WBC 8.1 4.4 - 11.3 x10*3/uL    nRBC 0.0 0.0 - 0.0 /100 WBCs    RBC 5.24 4.50 - 5.90 x10*6/uL    Hemoglobin 16.7 13.5 - 17.5 g/dL    Hematocrit 49.6 41.0 - 52.0 %    MCV 95 80 - 100 fL    MCH 31.9 26.0 - 34.0 pg    MCHC 33.7 32.0 - 36.0 g/dL    RDW 13.1 11.5 - 14.5 %    Platelets 233 150 - 450 x10*3/uL    Neutrophils % 59.7 40.0 - 80.0 %    Immature Granulocytes %, Automated 0.5 0.0 - 0.9 %    Lymphocytes % 30.9 13.0 - 44.0 %    Monocytes % 6.9 2.0 - 10.0 %    Eosinophils % 0.9 0.0 - 6.0 %    Basophils % 1.1 0.0 - 2.0 %    Neutrophils Absolute 4.86 1.20 - 7.70 x10*3/uL    Immature Granulocytes " Absolute, Automated 0.04 0.00 - 0.70 x10*3/uL    Lymphocytes Absolute 2.51 1.20 - 4.80 x10*3/uL    Monocytes Absolute 0.56 0.10 - 1.00 x10*3/uL    Eosinophils Absolute 0.07 0.00 - 0.70 x10*3/uL    Basophils Absolute 0.09 0.00 - 0.10 x10*3/uL   Magnesium   Result Value Ref Range    Magnesium 2.01 1.60 - 2.40 mg/dL   Comprehensive metabolic panel   Result Value Ref Range    Glucose 105 (H) 74 - 99 mg/dL    Sodium 139 136 - 145 mmol/L    Potassium 4.0 3.5 - 5.3 mmol/L    Chloride 105 98 - 107 mmol/L    Bicarbonate 24 21 - 32 mmol/L    Anion Gap 14 10 - 20 mmol/L    Urea Nitrogen 16 6 - 23 mg/dL    Creatinine 0.95 0.50 - 1.30 mg/dL    eGFR 89 >60 mL/min/1.73m*2    Calcium 10.1 8.6 - 10.3 mg/dL    Albumin 4.4 3.4 - 5.0 g/dL    Alkaline Phosphatase 24 (L) 33 - 136 U/L    Total Protein 7.3 6.4 - 8.2 g/dL    AST 15 9 - 39 U/L    Bilirubin, Total 0.5 0.0 - 1.2 mg/dL    ALT 18 10 - 52 U/L   Troponin I, High Sensitivity   Result Value Ref Range    Troponin I, High Sensitivity 5 0 - 20 ng/L   Protime-INR   Result Value Ref Range    Protime 11.9 9.8 - 12.8 seconds    INR 1.1 0.9 - 1.1   aPTT   Result Value Ref Range    aPTT 36 27 - 38 seconds   Urinalysis with Reflex Microscopic   Result Value Ref Range    Color, Urine Yellow Straw, Yellow    Appearance, Urine Clear Clear    Specific Gravity, Urine 1.011 1.005 - 1.035    pH, Urine 6.0 5.0, 5.5, 6.0, 6.5, 7.0, 7.5, 8.0    Protein, Urine NEGATIVE NEGATIVE mg/dL    Glucose, Urine NEGATIVE NEGATIVE mg/dL    Blood, Urine SMALL (1+) (A) NEGATIVE    Ketones, Urine 5 (TRACE) (A) NEGATIVE mg/dL    Bilirubin, Urine NEGATIVE NEGATIVE    Urobilinogen, Urine <2.0 <2.0 mg/dL    Nitrite, Urine NEGATIVE NEGATIVE    Leukocyte Esterase, Urine NEGATIVE NEGATIVE   Ethanol   Result Value Ref Range    Alcohol <10 <=10 mg/dL   Drug Screen, Urine   Result Value Ref Range    Amphetamine Screen, Urine Presumptive Negative Presumptive Negative    Barbiturate Screen, Urine Presumptive Negative Presumptive  Negative    Benzodiazepines Screen, Urine Presumptive Negative Presumptive Negative    Cannabinoid Screen, Urine Presumptive Negative Presumptive Negative    Cocaine Metabolite Screen, Urine Presumptive Negative Presumptive Negative    Fentanyl Screen, Urine Presumptive Negative Presumptive Negative    Opiate Screen, Urine Presumptive Negative Presumptive Negative    Oxycodone Screen, Urine Presumptive Negative Presumptive Negative    PCP Screen, Urine Presumptive Negative Presumptive Negative   Sars-CoV-2 and Influenza A/B PCR   Result Value Ref Range    Flu A Result Not Detected Not Detected    Flu B Result Not Detected Not Detected    Coronavirus 2019, PCR Not Detected Not Detected   Microscopic Only, Urine   Result Value Ref Range    WBC, Urine 1-5 1-5, NONE /HPF    RBC, Urine 1-2 NONE, 1-2, 3-5 /HPF    Mucus, Urine FEW Reference range not established. /LPF        Imaging   CT angio neck    Result Date: 2/11/2024  Interpreted By:  Ramez Lane, STUDY: CT ANGIO NECK; CT ANGIO HEAD W AND WO IV CONTRAST;  2/11/2024 4:02 pm   INDICATION: Signs/Symptoms: eval stroke like s/s.   COMPARISON: Head CT, same day   ACCESSION NUMBER(S): [id number]; [id number]   ORDERING CLINICIAN: SONDRA ALLEN   TECHNIQUE: 75 mm Omnipaque 350 was administered intravenously and axial images of the head and neck were acquired. Coronal and sagittal MIPs and 3-D reconstructions were created on an independent workstation and provided for review. Imaging was repeated due to poor bolus timing; the final images were suboptimal but adequate for interpretation.   FINDINGS: CTA Head:   Anterior circulation: There is mural calcification and atherosclerotic irregularity of both intracranial internal carotid arteries, no significant narrowing. Proximal anterior and middle cerebral arteries are unremarkable. No aneurysm.   Posterior circulation: Normal variant fetal type right posterior cerebral artery. There is mural calcification and  atherosclerotic irregularity of both V4 segments without significant narrowing. Question mild irregularity of the basilar artery, also without significant narrowing. The proximal posterior cerebral arteries are unremarkable. No aneurysm.   The dural venous sinuses are unremarkable.   CTA Neck:   Right carotid vessels: Partially calcified atherosclerotic plaque in the common carotid artery, bifurcation, and proximal internal carotid artery without significant luminal narrowing.   Left carotid vessels: Partially calcified atherosclerotic plaque in the common carotid artery, bifurcation, and proximal internal carotid artery without significant luminal narrowing.   Vertebral vessels:  Partially calcified atherosclerotic plaque at the origins of both cervical vertebral arteries without significant narrowing. There is also partially calcified atherosclerotic along the left V2 segment without significant narrowing, the cervical vertebral arteries are otherwise unremarkable.   Heterogeneous nodule in the right lobe the thyroid measuring up to 1.8 cm in greatest diameter, the soft tissues of the neck are otherwise unremarkable. Centrilobular and paraseptal emphysematous changes in the visualized lungs, right greater than left with associated biapical scarring. The visualized lungs otherwise clear. Degenerative changes in the spine status post C5-7 ACDF. The patient is edentulous.       1. Limited exam, no high-grade narrowing or occlusion in the head or neck. 2. Incidental right thyroid nodule, recommend comparison with prior outside imaging or nonemergent follow-up ultrasound for further characterization.   MACRO: None   Signed by: Ramez Lane 2/11/2024 4:15 PM Dictation workstation:   [id number]    CT angio head w and wo IV contrast    Result Date: 2/11/2024  Interpreted By:  Ramez Lane, STUDY: CT ANGIO NECK; CT ANGIO HEAD W AND WO IV CONTRAST;  2/11/2024 4:02 pm   INDICATION: Signs/Symptoms: eval stroke like  s/s.   COMPARISON: Head CT, same day   ACCESSION NUMBER(S): [id number]; [id number]   ORDERING CLINICIAN: SONDRA ALLEN   TECHNIQUE: 75 mm Omnipaque 350 was administered intravenously and axial images of the head and neck were acquired. Coronal and sagittal MIPs and 3-D reconstructions were created on an independent workstation and provided for review. Imaging was repeated due to poor bolus timing; the final images were suboptimal but adequate for interpretation.   FINDINGS: CTA Head:   Anterior circulation: There is mural calcification and atherosclerotic irregularity of both intracranial internal carotid arteries, no significant narrowing. Proximal anterior and middle cerebral arteries are unremarkable. No aneurysm.   Posterior circulation: Normal variant fetal type right posterior cerebral artery. There is mural calcification and atherosclerotic irregularity of both V4 segments without significant narrowing. Question mild irregularity of the basilar artery, also without significant narrowing. The proximal posterior cerebral arteries are unremarkable. No aneurysm.   The dural venous sinuses are unremarkable.   CTA Neck:   Right carotid vessels: Partially calcified atherosclerotic plaque in the common carotid artery, bifurcation, and proximal internal carotid artery without significant luminal narrowing.   Left carotid vessels: Partially calcified atherosclerotic plaque in the common carotid artery, bifurcation, and proximal internal carotid artery without significant luminal narrowing.   Vertebral vessels:  Partially calcified atherosclerotic plaque at the origins of both cervical vertebral arteries without significant narrowing. There is also partially calcified atherosclerotic along the left V2 segment without significant narrowing, the cervical vertebral arteries are otherwise unremarkable.   Heterogeneous nodule in the right lobe the thyroid measuring up to 1.8 cm in greatest diameter, the soft tissues of the  neck are otherwise unremarkable. Centrilobular and paraseptal emphysematous changes in the visualized lungs, right greater than left with associated biapical scarring. The visualized lungs otherwise clear. Degenerative changes in the spine status post C5-7 ACDF. The patient is edentulous.       1. Limited exam, no high-grade narrowing or occlusion in the head or neck. 2. Incidental right thyroid nodule, recommend comparison with prior outside imaging or nonemergent follow-up ultrasound for further characterization.   MACRO: None   Signed by: Ramez Lane 2/11/2024 4:15 PM Dictation workstation:   [id number]    XR chest 1 view    Result Date: 2/11/2024  Interpreted By:  Tomer Blanchard, STUDY: XR CHEST 1 VIEW;  2/11/2024 2:28 pm   INDICATION: Signs/Symptoms:cp.   COMPARISON: Two views of the chest, 3 May 2023   ACCESSION NUMBER(S): [id number]   ORDERING CLINICIAN: SONDRA ALLEN   TECHNIQUE: Single frontal view of the chest; Portable technique   FINDINGS:   LINES AND TUBES: n/a   HEART AND MEDIASTINUM: Heart size: Within normal limits Thoracic aorta: Within expected limits Ashley and nonvascular: within normal limits   PULMONARY VESSELS: Within normal limits; no sign of edema   LUNGS AND AIRWAYS: Clear; no acute airspace disease   PLEURA: Effusion: No substantial pleural effusion on either side Pneumothorax: No substantial pneumothorax on either side Other: n/a   BONES: No acute findings       NO ACUTE DISEASE IN THE CHEST   MACRO: None   Signed by: Tomer Blanchard 2/11/2024 2:32 PM Dictation workstation:   [id number]    CT head wo IV contrast    Result Date: 2/11/2024  Interpreted By:  Jessy Perkins, STUDY: CT HEAD WO IV CONTRAST;  2/11/2024 1:42 pm   INDICATION: Signs/Symptoms:HA blurry vision.   COMPARISON: None   ACCESSION NUMBER(S): [id number]   ORDERING CLINICIAN: SONDRA ALLEN   TECHNIQUE: Examination was performed in the axial plane using soft tissue and bone algorithm.   FINDINGS: INTRACRANIAL: There is  prominence of the ventricular system and cerebral sulci consistent with cerebral atrophy. There are periventricular hypodensities consistent with  moderate small vessel disease. No mass or mass effect is identified. There is no hemorrhage or subdural fluid collection. There is no acute infarct. 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 There is a small remote infarct involving the genu of the left internal capsule.   EXTRACRANIAL: Visualized paranasal sinuses and mastoids are clear.       No acute intracranial pathology.   MACRO: None   Signed by: Jessy Perkins 2/11/2024 1:52 PM Dictation workstation:   [id number]        Assessment/Plan   Principal Problem:    Dizziness  Acute Diplopia   Acute Nystagmus   Patient  is a 63 year old male. He reported this morning he woke up and was very dizzy and had double vision. He reports daily chronic headache.  On exam patient left eye nystagmus noted.   CT of Head Limited exam, no high-grade narrowing or occlusion in the head or neck No acute intracranial pathology.  CXR  No acute disease of the chest    Order NS IV @ 100 ml/hr   Order Zofran PRN Nausea   Order MRA Head   Order MRA Neck   Order MRI Brain   Order  US Carotid Artery   Order EKG  Order Tele Monitoring   Consult Cardiology Appreciate Recs       Thyroid Nodule   CT of Head Incidental right thyroid nodule, recommend comparison with prior outside imaging or nonemergent follow-up ultrasound for further   Ordered TSH Free T3/T4   US of Thyroid       Hypertension   Alcohol Induced Cardiomyopathy   BP poor ly controlled  191/97  HR 79   Patient reports he does not adhere to medication tx plan for HTN   Continue home med Bystolic   Order Echo   Monitor VS  Consult Cardiology Appreciate Recs       Tobacco Use Disorder   Patient reports he smokes 1ppd   Encouraged Smoking Cessation   Patient requested Nicoderm Patch- Ordered     Alcohol Use   Patient reports he  "\"cut back last month\" to 4 beer per day  Drug Screen Negative   Patient endorses mild anxiety   Hepatic Panel Ordered   Ammonia Level Order   Ordered Valium 5 mg po x 1 NOW     DVT Prophylaxis Lovenox   Fluids: NS @ 100 ml/HR   Electrolytes: replace as needed  Nutrition:  Regular   Adjuncts: PIV             Total accumulated time spent face to face and not face to face preparing to see the patient, obtaining and reviewing separately obtained history; performing a medically appropriate examination and/or evaluation; counseling and educating the patient, family; ordering medications, tests, or procedures; referring and communicating with other health care professionals; documenting clinical information in the patient's medical record; independently interpreting results and communicating the results to the patient, family; and care coordination was 60 minutes      Brennen Aguirre, APRN-CNP    "

## 2024-02-12 NOTE — CARE PLAN
The patient's goals for the shift include      The clinical goals for the shift include Patient to have improvement in Neuro status throughout shift      Tolerated all testing. Lt eye blurred vision improved. New medications initiated. Plan for Neuro consult tomorrow. Monitoring BP at this time.

## 2024-02-12 NOTE — CARE PLAN
The patient's goals for the shift include      The clinical goals for the shift include Patient to have improvement in Neuro status throughout shift  Patient verbalizes an improvement in dizziness, blurry vision and double vision. Increased steadiness when ambulating to the restroom. Continued difficulty touching nose when eyes are closed, minor nystagmus to left eye. No HA or nausea this shift. Tolerated fluids thru the night and are now stopped. Tele NSR in the 60's-70s. BP somewhat elevated with no obvious anxiety noted or signed of ETOH withdrawal.

## 2024-02-12 NOTE — CONSULTS
"  Cardiology Consult Note  Patient: Andrez Flores  Unit/Bed: 206/206-A  YOB: 1960    Admitting Diagnosis: Double vision [H53.2]  Dizziness [R42]  Date:  2/11/2024  Hospital Day: 0  Attending: Julianne Hare MD      Chief Complaint   Patient presents with    Eye Problem     Patient states he has had blurry vision in both eyes since waking up, he feels a little dizzy and has nausea as well. Denies chest pain         SUBJECTIVE:     History of Present Illness:  Andrez Flores is a 63 y.o. male patient who is being seen at the request of Dr. Johnson for inpatient consultation of  near syncope and uncontrolled HTN. Presents to ED with complaints of dizziness, acute diplopis and chronic headaches. Patient states he woke up yesterday morning with dizziness and blurred vision. Did not feel as though he was going to pass out, but felt disoriented trying to navigate around. If he closed one eye the blurred/double vision got better. He takes bystolic 20mg daily for BP, has not taken since last Thursday and does not monitor Bps at home. States whenever he is in the office \"they are good 180s-190s\". + Smoking 1pk/day for 50 years, ETOH use 4 beers daily, denies illicit drug use. ++Fam Hx CAD 3 brothers with MI <59 yo.     PMHx significant for Hypertension, Chronic headaches.   All previous records reviewed.    Review of Systems:   Review of Systems   All other systems reviewed and are negative.      Allergies:  Allergies   Allergen Reactions    Aspirin Unknown    Naproxen Unknown      Home Medication:  Medications Prior to Admission   Medication Sig Dispense Refill Last Dose    albuterol 0.63 mg/3 mL nebulizer solution Take 3 mL (0.63 mg) by nebulization every 6 hours if needed for wheezing.       fluticasone-umeclidin-vilanter (Trelegy Ellipta) 200-62.5-25 mcg blister with device Inhale 1 puff once every 24 hours.   2/9/2024    nebivolol (Bystolic) 20 mg tablet Take 1 tablet (20 mg) by mouth once daily.   2/9/2024    "   PMHx:  Past Medical History:   Diagnosis Date    Hypertension        PSHx:  Past Surgical History:   Procedure Laterality Date    KNEE Left 2018    knee surgery       Social Hx:  Social History     Socioeconomic History    Marital status:      Spouse name: antonio monroy    Number of children: 2    Years of education: None    Highest education level: None   Occupational History    Occupation: retired   Tobacco Use    Smoking status: Every Day     Packs/day: 1.00     Years: 15.00     Additional pack years: 0.00     Total pack years: 15.00     Types: Cigarettes     Passive exposure: Current    Smokeless tobacco: Never   Vaping Use    Vaping Use: Never used   Substance and Sexual Activity    Alcohol use: Yes     Alcohol/week: 28.0 standard drinks of alcohol     Types: 28 Cans of beer per week     Comment: 4 beers a day    Drug use: Never    Sexual activity: None   Other Topics Concern    None   Social History Narrative    None     Social Determinants of Health     Financial Resource Strain: Low Risk  (2/11/2024)    Overall Financial Resource Strain (CARDIA)     Difficulty of Paying Living Expenses: Not very hard   Food Insecurity: Not on file   Transportation Needs: No Transportation Needs (2/11/2024)    PRAPARE - Transportation     Lack of Transportation (Medical): No     Lack of Transportation (Non-Medical): No   Physical Activity: Not on file   Stress: Not on file   Social Connections: Not on file   Intimate Partner Violence: Not on file   Housing Stability: Low Risk  (2/11/2024)    Housing Stability Vital Sign     Unable to Pay for Housing in the Last Year: No     Number of Places Lived in the Last Year: 1     Unstable Housing in the Last Year: No       Family Hx:  Family History   Problem Relation Name Age of Onset    Hypothyroidism Mother      Other (mva) Father      Heart attack Daughter      Parkinsonism Maternal Grandfather         OBJECTIVE  Vitals:   Visit Vitals  BP (!) 165/100 (BP Location: Left arm,  Patient Position: Lying)   Pulse 70   Temp 36.5 °C (97.7 °F) (Temporal)   Resp 18        Wt Readings from Last 5 Encounters:   02/11/24 80 kg (176 lb 5.9 oz)   01/08/24 84.4 kg (186 lb)   08/30/23 82.9 kg (182 lb 12.8 oz)   08/09/23 82.9 kg (182 lb 12.8 oz)   07/26/23 82.9 kg (182 lb 12.8 oz)       Intake / Output:   I/O last 3 completed shifts:  In: 2420 (30.3 mL/kg) [P.O.:940; I.V.:980 (12.3 mL/kg); IV Piggyback:500]  Out: - (0 mL/kg)   Weight: 80 kg      Tele monitoring: SR 70s    Physical Examination:    Vitals reviewed.   Constitutional:       General: Awake.      Appearance: Normal and healthy appearance. Well-developed and not in distress.   Eyes:      General: Lids are normal.      Pupils: Pupils are equal, round, and reactive to light.   HENT:      Nose: Nose normal.    Mouth/Throat:      Lips: Pink.      Mouth: Mucous membranes are moist.   Neck:      Vascular: JVD normal.   Pulmonary:      Effort: Pulmonary effort is normal.      Breath sounds: Normal breath sounds and air entry.   Chest:      Chest wall: Not tender to palpatation.   Cardiovascular:      PMI at left midclavicular line. Normal rate. Regular rhythm. Normal S1. Normal S2.       Murmurs: There is no murmur.   Edema:     Peripheral edema absent.   Abdominal:      General: Bowel sounds are normal.      Palpations: Abdomen is soft.      Tenderness: There is no abdominal tenderness.   Musculoskeletal: Normal range of motion.      Cervical back: Full passive range of motion without pain, normal range of motion and neck supple. Skin:     General: Skin is warm and dry.   Neurological:      General: No focal deficit present.      Mental Status: Alert, oriented to person, place, and time and oriented to person, place and time. Mental status is at baseline.   Psychiatric:         Attention and Perception: Attention and perception normal.         Mood and Affect: Mood and affect normal.         Speech: Speech normal.         Behavior: Behavior normal.  "Behavior is cooperative.         Thought Content: Thought content normal.        LABS:  CMP:   Recent Labs     02/12/24  0412 02/11/24  1312 05/03/23  0820 04/06/18  0546 04/03/18  1001    139 141 140 144   K 3.7 4.0 5.0 4.2 5.2   * 105 104 104 105   CO2 23 24 24 26 31   ANIONGAP 10 14 13 14 13   BUN 14 16 15 13 19   CREATININE 0.84 0.95 1.0 0.81 0.80   EGFR >90 89 85  --   --    MG  --  2.01  --   --   --      LIVER ENZYMES:   Recent Labs     02/12/24 0412 02/11/24  1312 05/03/23  0820   ALBUMIN 3.8 4.4 4.4   ALT 15 18 25   AST 14 15 20   BILITOT 0.5 0.5 0.3     CBC:  Recent Labs     02/12/24 0412 02/11/24  1312 05/03/23  0820 04/06/18  0546 04/03/18  1001   WBC 6.4 8.1 6.9 9.0 10.9   HGB 14.8 16.7 17.0* 12.0* 13.9   HCT 44.9 49.6 50.8* 36.4* 43.6    233 233 348 399   MCV 95 95 97.3 94 97     COAG:   Recent Labs     02/11/24  1312 04/03/18  1001   INR 1.1 1.0     ABO: No results for input(s): \"ABO\" in the last 33369 hours.  HEME/ENDO:  Recent Labs     02/12/24 0412 02/11/24 1312 05/03/23  0820   TSH  --  1.44 1.98   HGBA1C 5.3  --   --       CARDIAC:   Recent Labs     02/11/24  1312   TROPHS 5       Lipid Panel:  No results found for: \"HDL\", \"CHHDL\", \"VLDL\", \"TRIG\", \"NHDL\"     Current Medications:   MEDICATIONS  Infusions:  [Held by provider] sodium chloride 0.9%, Last Rate: 100 mL/hr (02/12/24 0553)      Scheduled:  enoxaparin, 40 mg, q24h  famotidine, 20 mg, BID   Or  famotidine, 20 mg, BID  fluticasone, 2 spray, Daily  tiotropium, 2 Inhalation, Daily   And  fluticasone furoate-vilanteroL, 1 puff, Daily  guaiFENesin, 600 mg, BID  metoprolol succinate XL, 200 mg, Daily  nicotine, 1 patch, Daily      PRN:  acetaminophen, 650 mg, q4h PRN   Or  acetaminophen, 650 mg, q4h PRN   Or  acetaminophen, 650 mg, q4h PRN  albuterol, 0.63 mg, q6h PRN  melatonin, 3 mg, Nightly PRN  ondansetron, 4 mg, q8h PRN   Or  ondansetron, 4 mg, q8h PRN      Cardiovascular studies:      EKG:All available ECGs " independently reviewed    Echocardiogram: IMGRESULT(MDZK202:1:1825) No results found for this or any previous visit from the past 1825 days.    Stress Testing IMGRESULT(XOD5700:1:1825): No results found for this or any previous visit from the past 1825 days.    Cardiac Catheterization: No results found for this or any previous visit from the past 1825 days.  No results found for this or any previous visit from the past 3650 days.     Cardiac Scoring: No results found for this or any previous visit from the past 1825 days.    AAA : No results found for this or any previous visit from the past 1825 days.    OTHER: No results found for this or any previous visit from the past 1825 days.      LAST IMAGING RESULTS   Carotid duplex bilateral  Preliminary Cardiology Report              [place] [address]   Tel [phone] and Fax [phone]           Preliminary Vascular Lab Report     [facility] CAROTID ARTERY DUPLEX BILATERAL       Patient Name:      [facility] Physician:  [id number] Mj Montgomery MD  Study Date:        2/11/2024    Ordering Physician: [id number] BOB FORDE  MRN/PID:           [id number]     Technologist:       GAIL PA RDMS, T  Accession#:        [id number] Technologist 2:  Date of Birth/Age: 1960    Encounter#:         [id number]  Gender:            M  Admission Status:  Inpatient    Location Performed: [facility]       Diagnosis/ICD: Dizziness and giddiness-R42  Procedure/CPT: 77856 Cerebrovascular Carotid Duplex scan complete       Patient History: HTN. Patient also has history of daily ETOH use. Patient                   complains of dizziness and blurry vision since waking up today                   (the blurry vision is only when both eyes are open, vision is                   clear when only one eye is open). No known history of previous                   vasc surg/stent.  Smoker:          Current, 1 pack per day.        PRELIMINARY CONCLUSIONS:  Right Carotid: Findings are consistent with less than 50% stenosis of the right proximal internal carotid artery. Laminar flow seen by color Doppler. Right external carotid artery appears patent with no evidence of stenosis. The right vertebral artery is patent with antegrade flow. No evidence of hemodynamically significant stenosis in the right subclavian artery.  Left Carotid: Findings are consistent with less than 50% stenosis of the left proximal internal carotid artery. Laminar flow seen by color Doppler. Left external carotid artery appears patent with no evidence of stenosis. The left vertebral artery is patent with antegrade flow. No evidence of hemodynamically significant stenosis in the left subclavian artery.     Imaging & Doppler Findings:  Right Plaque Morph: The proximal right internal carotid artery demonstrates calcified plaque. The proximal right external carotid artery demonstrates calcified plaque. The distal right common carotid artery demonstrates calcified plaque.  Left Plaque Morph: The proximal left internal carotid artery demonstrates calcified plaque. The proximal left external carotid artery demonstrates calcified plaque. The mid left common carotid artery demonstrates calcified plaque. The distal left common carotid artery demonstrates calcified plaque.      Right                        Left    PSV      EDV                PSV      EDV  95 cm/s  14 cm/s   CCA P    65 cm/s  15 cm/s  69 cm/s  17 cm/s   CCA D    60 cm/s  14 cm/s  52 cm/s  18 cm/s   ICA P    65 cm/s  21 cm/s  59 cm/s  23 cm/s   ICA M    72 cm/s  26 cm/s  96 cm/s  36 cm/s   ICA D    49 cm/s  14 cm/s  115 cm/s 15 cm/s    ECA     107 cm/s 13 cm/s  41 cm/s  15 cm/s Vertebral  46 cm/s  14 cm/s  178 cm/s         Subclavian 120 cm/s                     Right Left  ICA/CCA Ratio  0.8  1.1            VASCULAR PRELIMINARY REPORT  completed by GAIL PA on 2/11/2024 at 8:23:58 PM       ** Final **  CT  angio neck, CT angio head w and wo IV contrast  Narrative: Interpreted By:  Ramez Lane,   STUDY:  CT ANGIO NECK; CT ANGIO HEAD W AND WO IV CONTRAST;  2/11/2024 4:02 pm      INDICATION:  Signs/Symptoms: eval stroke like s/s.      COMPARISON:  Head CT, same day      ACCESSION NUMBER(S):  [id number]; [id number]      ORDERING CLINICIAN:  SONDRA ALLEN      TECHNIQUE:  75 mm Omnipaque 350 was administered intravenously and axial images  of the head and neck were acquired. Coronal and sagittal MIPs and 3-D  reconstructions were created on an independent workstation and  provided for review. Imaging was repeated due to poor bolus timing;  the final images were suboptimal but adequate for interpretation.      FINDINGS:  CTA Head:      Anterior circulation: There is mural calcification and  atherosclerotic irregularity of both intracranial internal carotid  arteries, no significant narrowing. Proximal anterior and middle  cerebral arteries are unremarkable. No aneurysm.      Posterior circulation: Normal variant fetal type right posterior  cerebral artery. There is mural calcification and atherosclerotic  irregularity of both V4 segments without significant narrowing.  Question mild irregularity of the basilar artery, also without  significant narrowing. The proximal posterior cerebral arteries are  unremarkable. No aneurysm.      The dural venous sinuses are unremarkable.      CTA Neck:      Right carotid vessels: Partially calcified atherosclerotic plaque in  the common carotid artery, bifurcation, and proximal internal carotid  artery without significant luminal narrowing.      Left carotid vessels: Partially calcified atherosclerotic plaque in  the common carotid artery, bifurcation, and proximal internal carotid  artery without significant luminal narrowing.      Vertebral vessels:  Partially calcified atherosclerotic plaque at the  origins of both cervical vertebral arteries without significant  narrowing. There  is also partially calcified atherosclerotic along  the left V2 segment without significant narrowing, the cervical  vertebral arteries are otherwise unremarkable.      Heterogeneous nodule in the right lobe the thyroid measuring up to  1.8 cm in greatest diameter, the soft tissues of the neck are  otherwise unremarkable. Centrilobular and paraseptal emphysematous  changes in the visualized lungs, right greater than left with  associated biapical scarring. The visualized lungs otherwise clear.  Degenerative changes in the spine status post C5-7 ACDF. The patient  is edentulous.      Impression: 1. Limited exam, no high-grade narrowing or occlusion in the head or  neck.  2. Incidental right thyroid nodule, recommend comparison with prior  outside imaging or nonemergent follow-up ultrasound for further  characterization.      MACRO:  None      Signed by: Ramez Lane 2/11/2024 4:15 PM  Dictation workstation:   [id number]  XR chest 1 view  Narrative: Interpreted By:  Tomer Blanchard,   STUDY:  XR CHEST 1 VIEW;  2/11/2024 2:28 pm      INDICATION:  Signs/Symptoms:cp.      COMPARISON:  Two views of the chest, 3 May 2023      ACCESSION NUMBER(S):  [id number]      ORDERING CLINICIAN:  SONDRA ALLEN      TECHNIQUE:  Single frontal view of the chest; Portable technique      FINDINGS:      LINES AND TUBES: n/a      HEART AND MEDIASTINUM:  Heart size: Within normal limits  Thoracic aorta: Within expected limits  Ashley and nonvascular: within normal limits      PULMONARY VESSELS:  Within normal limits; no sign of edema      LUNGS AND AIRWAYS:  Clear; no acute airspace disease      PLEURA:  Effusion: No substantial pleural effusion on either side  Pneumothorax: No substantial pneumothorax on either side  Other: n/a      BONES:  No acute findings      Impression: NO ACUTE DISEASE IN THE CHEST      MACRO:  None      Signed by: Tomer Blanchard 2/11/2024 2:32 PM  Dictation workstation:   [id number]  CT head wo IV contrast  Narrative:  Interpreted By:  Jessy Perkins,   STUDY:  CT HEAD WO IV CONTRAST;  2/11/2024 1:42 pm      INDICATION:  Signs/Symptoms:HA blurry vision.      COMPARISON:  None      ACCESSION NUMBER(S):  [id number]      ORDERING CLINICIAN:  SONDRA ALLEN      TECHNIQUE:  Examination was performed in the axial plane using soft tissue and  bone algorithm.      FINDINGS:  INTRACRANIAL:  There is prominence of the ventricular system and cerebral sulci  consistent with cerebral atrophy. There are periventricular  hypodensities consistent with  moderate small vessel disease. No mass  or mass effect is identified. There is no hemorrhage or subdural  fluid collection. There is no acute infarct.  There is a small remote infarct involving the deep white matter of  the right posterior frontal region. There is a small remote infarct  involving the genu of the left internal capsule.      EXTRACRANIAL:  Visualized paranasal sinuses and mastoids are clear.      Impression: No acute intracranial pathology.      MACRO:  None      Signed by: Jessy Perkins 2/11/2024 1:52 PM  Dictation workstation:   [id number]      Assessment:    Patient Active Problem List   Diagnosis    Closed bicondylar fracture of left tibial plateau    Closed vertical fracture of patella    Effusion of left knee    Pain of left knee after injury    Stiffness of left knee, not elsewhere classified    Tibial plateau fracture    Sprain, lumbosacral    GERD (gastroesophageal reflux disease)    Hypertension    COPD (chronic obstructive pulmonary disease) (CMS/HCC)    Acute stress disorder    Valgus deformity of both feet    Leg length discrepancy    Post-traumatic osteoarthritis    Bilateral post-traumatic osteoarthritis of knee    Retained orthopedic hardware    Primary osteoarthritis of left knee    Dizziness        Assessment/Plan   Problem List Items Addressed This Visit             ICD-10-CM    * (Principal) Dizziness R42    Relevant Orders    Carotid duplex bilateral  (Completed)    Transthoracic Echo (TTE) Complete    Hypertension - Primary I10    Relevant Orders    Transthoracic Echo (TTE) Complete     Other Visit Diagnoses         Codes    Double vision     H53.2    Alcoholic cardiomyopathy (CMS/HCC)     I42.6    Relevant Medications    metoprolol succinate XL (Toprol-XL) 24 hr tablet 200 mg    Abnormal electrocardiogram (ECG) (EKG)     R94.31    Relevant Orders    Transthoracic Echo (TTE) Complete          62 yo gentleman admitted with Acute diplopia, blurred vision and dizziness in the setting of HTN urgency  CVA R/o  Chronic Daily headaches  Medication Noncompliance  ETOH use  HLD, untreated    Plan:  Echocardiogram pending to assess LVEF and Structural heart  BP suboptimally controlled on Bystolic and medication noncompliance  Resume Bystolic 20 mg daily - (Hospital Substitute is Metoprolol 200 mg daily)  MRI/MRA pending per primary team  Avoid aggressive drop in BP while MRI/MRA pending  If no acute CVA will consider adding Losartan-hydrochlorothiazide to regimen  Supportive treatment per primary team  Advise on smoking cessation and ETOH cessation    Further recommendations ending above results    Thank you for the opportunity to participate in the care of your patient.   Do not hesitate to call if you have any questions.  Will Follow     Electronically signed by DONG Urban on 2/12/2024 at 8:44 AM  ____________________________________________________________  Division of Cardiovascular Medicine  [facility]  [facility]

## 2024-02-12 NOTE — PROGRESS NOTES
TCC spoke with patient regarding discharge planning and home going needs. Patient lives  in a mobile home with his mother and brother.  Patient says he is independent with ADLs and ambulation.  He drives.  Confirmed with patient PCP is  Chad Bynum.  Discharge Plan is for patient to discharge home.  TCC will continue to follow for changes in discharge planning needs.

## 2024-02-13 ENCOUNTER — HOSPITAL ENCOUNTER (INPATIENT)
Dept: CARDIOLOGY | Facility: HOSPITAL | Age: 64
Discharge: HOME | DRG: 065 | End: 2024-02-13
Payer: COMMERCIAL

## 2024-02-13 VITALS
SYSTOLIC BLOOD PRESSURE: 155 MMHG | RESPIRATION RATE: 18 BRPM | BODY MASS INDEX: 26.12 KG/M2 | HEART RATE: 70 BPM | HEIGHT: 69 IN | WEIGHT: 176.37 LBS | TEMPERATURE: 97.9 F | DIASTOLIC BLOOD PRESSURE: 87 MMHG | OXYGEN SATURATION: 96 %

## 2024-02-13 DIAGNOSIS — R42 DIZZINESS AND GIDDINESS: ICD-10-CM

## 2024-02-13 DIAGNOSIS — I63.9 CEREBROVASCULAR ACCIDENT (CVA), UNSPECIFIED MECHANISM (MULTI): ICD-10-CM

## 2024-02-13 LAB
ANION GAP SERPL CALC-SCNC: 15 MMOL/L (ref 10–20)
AORTIC VALVE PEAK VELOCITY: 1.46 M/S
AV PEAK GRADIENT: 8.5 MMHG
AVA (PEAK VEL): 2.35 CM2
BACTERIA UR CULT: NORMAL
BUN SERPL-MCNC: 16 MG/DL (ref 6–23)
CALCIUM SERPL-MCNC: 9.2 MG/DL (ref 8.6–10.3)
CHLORIDE SERPL-SCNC: 109 MMOL/L (ref 98–107)
CO2 SERPL-SCNC: 17 MMOL/L (ref 21–32)
CREAT SERPL-MCNC: 0.93 MG/DL (ref 0.5–1.3)
EGFRCR SERPLBLD CKD-EPI 2021: >90 ML/MIN/1.73M*2
EJECTION FRACTION APICAL 4 CHAMBER: 60
EJECTION FRACTION: 61 %
ERYTHROCYTE [DISTWIDTH] IN BLOOD BY AUTOMATED COUNT: 13.1 % (ref 11.5–14.5)
GLUCOSE SERPL-MCNC: 97 MG/DL (ref 74–99)
HCT VFR BLD AUTO: 46.8 % (ref 41–52)
HGB BLD-MCNC: 15.3 G/DL (ref 13.5–17.5)
LEFT ATRIUM VOLUME AREA LENGTH INDEX BSA: 23.2 ML/M2
LEFT VENTRICLE INTERNAL DIMENSION DIASTOLE: 5.04 CM (ref 3.5–6)
LEFT VENTRICULAR OUTFLOW TRACT DIAMETER: 2.1 CM
MCH RBC QN AUTO: 31.5 PG (ref 26–34)
MCHC RBC AUTO-ENTMCNC: 32.7 G/DL (ref 32–36)
MCV RBC AUTO: 96 FL (ref 80–100)
MITRAL VALVE E/A RATIO: 0.72
MITRAL VALVE E/E' RATIO: 7.8
NRBC BLD-RTO: 0 /100 WBCS (ref 0–0)
PLATELET # BLD AUTO: 189 X10*3/UL (ref 150–450)
POTASSIUM SERPL-SCNC: 4 MMOL/L (ref 3.5–5.3)
RBC # BLD AUTO: 4.86 X10*6/UL (ref 4.5–5.9)
RIGHT VENTRICLE FREE WALL PEAK S': 15.7 CM/S
SODIUM SERPL-SCNC: 137 MMOL/L (ref 136–145)
TRICUSPID ANNULAR PLANE SYSTOLIC EXCURSION: 2.5 CM
WBC # BLD AUTO: 8.6 X10*3/UL (ref 4.4–11.3)

## 2024-02-13 PROCEDURE — 2500000001 HC RX 250 WO HCPCS SELF ADMINISTERED DRUGS (ALT 637 FOR MEDICARE OP): Mod: IPSPLIT | Performed by: PHYSICIAN ASSISTANT

## 2024-02-13 PROCEDURE — 99232 SBSQ HOSP IP/OBS MODERATE 35: CPT | Performed by: NURSE PRACTITIONER

## 2024-02-13 PROCEDURE — 2500000001 HC RX 250 WO HCPCS SELF ADMINISTERED DRUGS (ALT 637 FOR MEDICARE OP): Mod: IPSPLIT

## 2024-02-13 PROCEDURE — 82947 ASSAY GLUCOSE BLOOD QUANT: CPT | Mod: IPSPLIT

## 2024-02-13 PROCEDURE — 2500000002 HC RX 250 W HCPCS SELF ADMINISTERED DRUGS (ALT 637 FOR MEDICARE OP, ALT 636 FOR OP/ED): Mod: IPSPLIT | Performed by: NURSE PRACTITIONER

## 2024-02-13 PROCEDURE — 85027 COMPLETE CBC AUTOMATED: CPT | Mod: IPSPLIT

## 2024-02-13 PROCEDURE — 2500000001 HC RX 250 WO HCPCS SELF ADMINISTERED DRUGS (ALT 637 FOR MEDICARE OP): Mod: IPSPLIT | Performed by: NURSE PRACTITIONER

## 2024-02-13 PROCEDURE — 93246 EXT ECG>7D<15D RECORDING: CPT | Mod: IPSPLIT

## 2024-02-13 PROCEDURE — 94640 AIRWAY INHALATION TREATMENT: CPT | Mod: IPSPLIT

## 2024-02-13 PROCEDURE — 36415 COLL VENOUS BLD VENIPUNCTURE: CPT | Mod: IPSPLIT

## 2024-02-13 PROCEDURE — 93248 EXT ECG>7D<15D REV&INTERPJ: CPT | Performed by: STUDENT IN AN ORGANIZED HEALTH CARE EDUCATION/TRAINING PROGRAM

## 2024-02-13 PROCEDURE — 99232 SBSQ HOSP IP/OBS MODERATE 35: CPT

## 2024-02-13 PROCEDURE — 99222 1ST HOSP IP/OBS MODERATE 55: CPT | Performed by: PSYCHIATRY & NEUROLOGY

## 2024-02-13 PROCEDURE — 94668 MNPJ CHEST WALL SBSQ: CPT | Mod: IPSPLIT

## 2024-02-13 PROCEDURE — S4991 NICOTINE PATCH NONLEGEND: HCPCS | Mod: IPSPLIT | Performed by: NURSE PRACTITIONER

## 2024-02-13 PROCEDURE — 93880 EXTRACRANIAL BILAT STUDY: CPT | Performed by: SURGERY

## 2024-02-13 PROCEDURE — 2500000004 HC RX 250 GENERAL PHARMACY W/ HCPCS (ALT 636 FOR OP/ED): Mod: IPSPLIT

## 2024-02-13 RX ORDER — LOSARTAN POTASSIUM 50 MG/1
50 TABLET ORAL DAILY
Qty: 30 TABLET | Refills: 1 | Status: SHIPPED | OUTPATIENT
Start: 2024-02-14 | End: 2024-03-29 | Stop reason: SDUPTHER

## 2024-02-13 RX ORDER — LOSARTAN POTASSIUM 25 MG/1
25 TABLET ORAL DAILY
Status: DISCONTINUED | OUTPATIENT
Start: 2024-02-13 | End: 2024-02-13

## 2024-02-13 RX ORDER — LOSARTAN POTASSIUM 50 MG/1
50 TABLET ORAL DAILY
Status: DISCONTINUED | OUTPATIENT
Start: 2024-02-13 | End: 2024-02-13 | Stop reason: HOSPADM

## 2024-02-13 RX ORDER — HYDROCHLOROTHIAZIDE 12.5 MG/1
12.5 CAPSULE ORAL DAILY
Qty: 30 CAPSULE | Refills: 1 | Status: SHIPPED | OUTPATIENT
Start: 2024-02-14 | End: 2024-03-29 | Stop reason: SDUPTHER

## 2024-02-13 RX ORDER — ATORVASTATIN CALCIUM 40 MG/1
40 TABLET, FILM COATED ORAL NIGHTLY
Qty: 30 TABLET | Refills: 1 | Status: SHIPPED | OUTPATIENT
Start: 2024-02-13 | End: 2024-03-29 | Stop reason: SDUPTHER

## 2024-02-13 RX ORDER — ALBUTEROL SULFATE 90 UG/1
2 AEROSOL, METERED RESPIRATORY (INHALATION) AS NEEDED
Qty: 8.5 G | Refills: 3 | Status: SHIPPED | OUTPATIENT
Start: 2024-02-13

## 2024-02-13 RX ORDER — HYDROCHLOROTHIAZIDE 12.5 MG/1
12.5 TABLET ORAL DAILY
Status: DISCONTINUED | OUTPATIENT
Start: 2024-02-13 | End: 2024-02-13 | Stop reason: HOSPADM

## 2024-02-13 RX ORDER — CLOPIDOGREL BISULFATE 75 MG/1
75 TABLET ORAL DAILY
Qty: 30 TABLET | Refills: 1 | Status: SHIPPED | OUTPATIENT
Start: 2024-02-14 | End: 2024-03-29 | Stop reason: SDUPTHER

## 2024-02-13 RX ADMIN — FLUTICASONE PROPIONATE 2 SPRAY: 50 SPRAY, METERED NASAL at 08:03

## 2024-02-13 RX ADMIN — NICOTINE 1 PATCH: 14 PATCH, EXTENDED RELEASE TRANSDERMAL at 08:03

## 2024-02-13 RX ADMIN — TIOTROPIUM BROMIDE INHALATION SPRAY 2 PUFF: 3.12 SPRAY, METERED RESPIRATORY (INHALATION) at 09:13

## 2024-02-13 RX ADMIN — HYDROCHLOROTHIAZIDE 12.5 MG: 12.5 TABLET ORAL at 11:08

## 2024-02-13 RX ADMIN — LOSARTAN POTASSIUM 50 MG: 50 TABLET, FILM COATED ORAL at 11:08

## 2024-02-13 RX ADMIN — FLUTICASONE FUROATE AND VILANTEROL TRIFENATATE 1 PUFF: 200; 25 POWDER RESPIRATORY (INHALATION) at 09:12

## 2024-02-13 RX ADMIN — GUAIFENESIN 600 MG: 600 TABLET, EXTENDED RELEASE ORAL at 08:03

## 2024-02-13 RX ADMIN — CLOPIDOGREL BISULFATE 75 MG: 75 TABLET ORAL at 08:03

## 2024-02-13 RX ADMIN — METOPROLOL SUCCINATE 200 MG: 100 TABLET, EXTENDED RELEASE ORAL at 08:03

## 2024-02-13 RX ADMIN — FAMOTIDINE 20 MG: 20 TABLET, FILM COATED ORAL at 08:03

## 2024-02-13 ASSESSMENT — PAIN SCALES - GENERAL: PAINLEVEL_OUTOF10: 0 - NO PAIN

## 2024-02-13 NOTE — PROGRESS NOTES
Andrez Flores is a 63 y.o. male on day 1 of admission presenting with Dizziness.      Subjective   Pt assessed at bedside. Sitting at the edge of the bed. Neuro exam unremarkable. Denies any concerns. Waiting for tele neuro consult today.        Objective     Last Recorded Vitals  /89 (BP Location: Left arm, Patient Position: Lying)   Pulse 64   Temp 36.8 °C (98.2 °F) (Temporal)   Resp 16   Wt 80 kg (176 lb 5.9 oz)   SpO2 96%   Intake/Output last 3 Shifts:    Intake/Output Summary (Last 24 hours) at 2/13/2024 1132  Last data filed at 2/12/2024 1306  Gross per 24 hour   Intake 480 ml   Output --   Net 480 ml       Admission Weight  Weight: 81.6 kg (180 lb) (02/11/24 1253)    Daily Weight  02/11/24 : 80 kg (176 lb 5.9 oz)    Image Results  Electrocardiogram, 12-lead PRN ACS symptoms  Normal sinus rhythm  Normal ECG  When compared with ECG of 11-FEB-2024 13:04,  No significant change was found    Physical Exam   HENT:      Head: Normocephalic.      Nose: Nose normal.      Mouth/Throat:      Pharynx: Oropharynx is clear.   Eyes:      Conjunctiva/sclera: Conjunctivae normal.   Cardiovascular:      Rate and Rhythm: Normal rate and regular rhythm.   Pulmonary:      Breath sounds: Decreased breath sounds present.   Abdominal:      General: Bowel sounds are normal.      Palpations: Abdomen is soft.   Musculoskeletal:         General: Normal range of motion.      Cervical back: Normal range of motion and neck supple.   Skin:     General: Skin is dry.   Neurological:      General: No focal deficit present.      Mental Status: He is alert and oriented to person, place, and time.      Comments: 1A: LOC  -0 Alert; Keenly responsive     1B: Ask month and age  0 Both questions right      1C: Blink eyes and squeeze hands  0 performs both tasks     2: Horizontal EOM  0 normal     3: Visual Fields  0 no visual loss     4: Facial Palsy  0 normal symmetry     5A: L arm Motor drift  0 no drift after 10 seconds     5B: R Arm motor  drift   0 no drift after 10 seconds     6A: L leg motor drift   0 no drift after 5 seconds     6B: R Leg motor drift  0 no drift after 5 seconds     7: Limb ataxia (FNF; heel-shin)  0 no ataxia     8: Sensation  0 Normal no sensory loss     9: Language/Aphasia  0 normal, no aphasia     10: Dysarthria   0 Normal     11: Extinction/Inattention  0 No abnormality        Total: 0     Psychiatric:         Mood and Affect: Mood normal.         Behavior: Behavior normal.     Relevant Results  Scheduled medications  atorvastatin, 40 mg, oral, Nightly  clopidogrel, 75 mg, oral, Daily  enoxaparin, 40 mg, subcutaneous, q24h  famotidine, 20 mg, oral, BID   Or  famotidine, 20 mg, intravenous, BID  fluticasone, 2 spray, Each Nostril, Daily  tiotropium, 2 Inhalation, inhalation, Daily   And  fluticasone furoate-vilanteroL, 1 puff, inhalation, Daily  guaiFENesin, 600 mg, oral, BID  hydroCHLOROthiazide, 12.5 mg, oral, Daily  losartan, 50 mg, oral, Daily  metoprolol succinate XL, 200 mg, oral, Daily  nicotine, 1 patch, transdermal, Daily      Continuous medications  [Held by provider] sodium chloride 0.9%, 100 mL/hr, Last Rate: 100 mL/hr (02/12/24 0553)      PRN medications  PRN medications: acetaminophen **OR** [DISCONTINUED] acetaminophen **OR** [DISCONTINUED] acetaminophen, albuterol, melatonin, ondansetron **OR** ondansetron    Results for orders placed or performed during the hospital encounter of 02/11/24 (from the past 24 hour(s))   CBC   Result Value Ref Range    WBC 8.6 4.4 - 11.3 x10*3/uL    nRBC 0.0 0.0 - 0.0 /100 WBCs    RBC 4.86 4.50 - 5.90 x10*6/uL    Hemoglobin 15.3 13.5 - 17.5 g/dL    Hematocrit 46.8 41.0 - 52.0 %    MCV 96 80 - 100 fL    MCH 31.5 26.0 - 34.0 pg    MCHC 32.7 32.0 - 36.0 g/dL    RDW 13.1 11.5 - 14.5 %    Platelets 189 150 - 450 x10*3/uL   Basic Metabolic Panel   Result Value Ref Range    Glucose 97 74 - 99 mg/dL    Sodium 137 136 - 145 mmol/L    Potassium 4.0 3.5 - 5.3 mmol/L    Chloride 109 (H) 98 -  107 mmol/L    Bicarbonate 17 (L) 21 - 32 mmol/L    Anion Gap 15 10 - 20 mmol/L    Urea Nitrogen 16 6 - 23 mg/dL    Creatinine 0.93 0.50 - 1.30 mg/dL    eGFR >90 >60 mL/min/1.73m*2    Calcium 9.2 8.6 - 10.3 mg/dL       Assessment/Plan        Principal Problem:    Dizziness    #Left parietal lobe infarcts  #Hypertension  -CT head: 1. Limited exam, no high-grade narrowing or occlusion in the head or  neck.  2. Incidental right thyroid nodule, recommend comparison with prior  outside imaging or nonemergent follow-up ultrasound for further  characterization.  -MRI brain: 2 punctate foci of diffusion restriction in the left parietal lobe  with mild associated FLAIR signal abnormality, which may represent  small recent infarcts. No associated hemorrhage or mass effect.  Chronic right striatocapsular infarct. Moderate to severe  microangiopathic disease. Mild diffuse parenchymal volume loss  without specific lobar pattern to suggest an underlying  neurodegenerative process  -US thyroid: 1. Right mid TI-RADS category 3 thyroid nodule measuring 2.2 cm. Per  TI-RADS criteria and given size advise follow-up in 1, 3, 5 years.  2. Left inferior TI-RADS category 4 thyroid nodule measuring 1.2 cm.  Per TI-RADS criteria and given size advise follow-up in 1, 2, 3, 5  years  3. Slightly heterogenous thyroid echotexture, nonspecific, however  could be related to underlying chronic thyroid disease. Advise  correlation with thyroid function tests.  -Lipid panel: Nini 199, HDL 38.8, , Trig 122  -Hgb A1C 5.2  -Echo pending  -Cardiac monitoring  -Cardiology consult, appreciate recs   -q4 neuro checks  -q4 vital signs  -Continue home nebivolol which is substituted for 200mg metoprolol XL inpatient  -Cardiology added losartan 50mg, hydrochlorothiazide 12.5mg for blood pressure management      #COPD, not in acute exacerbation  #Tobacco use disorder  #ETOH use disorder  -Encourage cessation  -Nicotine patch   -Continue mucinex, spiriva,  breo, flonase     DVT ppx  -lovenox     F: PRN  E: Replete per protocol  N: Regular  A: PIV     Disposition: Pt requires less than 2 inpatient days at this time   Code Status: Full Code     Total accumulated time spent face to face and not face to face preparing to see the patient, obtaining and reviewing separately obtained history; performing a medically appropriate examination and/or evaluation; counseling and educating the patient, family; ordering medications, tests, or procedures; referring and communicating with other health care professionals; documenting clinical information in the patient's medical record; independently interpreting results and communicating the results to the patient, family; and care coordination was 30 minutes.           Michelle Doan, JORGE-CNP

## 2024-02-13 NOTE — TELECONSULT
HPI:  63 y.o. male with HTN and tobacco use who presented to [facility] 2/11 with acute onset dizziness and diplopia. Found to have two punctate bazzi on MRI in L parietal lobe.    Tobacco: yes  Alcohol: yes 4 beers/day  Substance Use: no     Imaging Results:  Head CT: unremarkable  Head/Neck CTA/P: plaque in bilateral carotid bifurcations, no flow limiting stenosis  MRI: two punctate foci of diffusion restriction in L parietal lobe  LDL: 136  A1c: 5.2  TTE: unremarkable    Virtual Neurological Exam:     General appearance- in no acute distress.   Mental status: alert, interactive and cooperative with an appropriate affect.    Speech is clear.  Language intact for comprehension. Naming intact to low and high frequency items. Repetition intact. Follows complex, cross body commands.  Cranial nerves: No ptosis. Facial symmetry and movements are normal.    Ocular movements full without complaints of diplopia or observed nystagmus. Self-reported visual fields are full.    Motor: Perhaps mild pronator drift in RUE. No leg drift. No abnormal or adventitious motor movements were noted.   Coordination of UE is normal.   Sensation: light touch is reported to be intact.    Able to stand independently with stable stance.  Gait straightaway is normal without observed spasticity or ataxia.       Assessment:   Working Diagnosis:   Ischemic Stroke   Mechanism: Likely small vessel disease but will send with 2 week ziopatch.    Recommendations:   Plavix 75mg daily  2.   Atorvastatin 40mg  3.   Normotension per primary team; adjust meds as needed to attain this  4.   Ziopatch x2 weeks  5.   Follow up in stroke clinic     Patient was discussed with Stroke Attending,Dr. An.     Consult completed by:  TELEPHONE and VIDEO interactive communication was used to provide this telehealth service. Time includes consultation with ED provider and extensive review of data- history, medical records, lab/radiology/medical test results,  neuroimaging studies:  30 minutes was spent in INITIAL telehealth consultation    I saw and evaluated the patient. I personally obtained the key and critical portions of the history and physical exam or was physically present for key and critical portions performed by the resident/fellow. I reviewed the resident/fellow's documentation and discussed the patient with the resident/fellow. I agree with the resident/fellow's medical decision making as documented in the note with the exception/addition of the following:    I saw and evaluated the patient. I personally obtained the key and critical portions of the history and physical exam or was physically present for key and critical portions performed by the resident/fellow. I reviewed the resident/fellow's documentation and discussed the patient with the resident/fellow. I agree with the resident/fellow's medical decision making as documented in the note with the exception/addition of the following:  Probable lacunar stroke, agree with management outlined by Dr Wilson.  I was present for the entire visit.

## 2024-02-13 NOTE — PROGRESS NOTES
"Andrez Flores is a 63 y.o. male on day 1 of admission presenting with Dizziness.    Subjective   Sitting up in bed without complaints. No further dizziness or blurred/double vision. Denies CP, SOB, palpitations. No acute concerns or distress. Bps remain borderline controlled.       Objective     Physical Exam  Constitutional:       General: He is not in acute distress.  Eyes:      Pupils: Pupils are equal, round, and reactive to light.   Neck:      Vascular: No carotid bruit.   Cardiovascular:      Rate and Rhythm: Normal rate and regular rhythm.      Pulses: Normal pulses.      Heart sounds: Normal heart sounds. No murmur heard.  Pulmonary:      Effort: Pulmonary effort is normal. No respiratory distress.      Breath sounds: Normal breath sounds.   Abdominal:      General: There is no distension.      Palpations: Abdomen is soft.      Tenderness: There is no abdominal tenderness.   Musculoskeletal:         General: No swelling.      Cervical back: Neck supple.   Skin:     General: Skin is warm.   Neurological:      Mental Status: He is alert and oriented to person, place, and time. Mental status is at baseline.   Psychiatric:         Mood and Affect: Mood normal.         Behavior: Behavior normal.         Last Recorded Vitals  Blood pressure 151/88, pulse 66, temperature 36.8 °C (98.2 °F), temperature source Temporal, resp. rate 16, height 1.753 m (5' 9\"), weight 80 kg (176 lb 5.9 oz), SpO2 96 %.  Intake/Output last 3 Shifts:  I/O last 3 completed shifts:  In: 2760 (34.5 mL/kg) [P.O.:1780; I.V.:980 (12.3 mL/kg)]  Out: - (0 mL/kg)   Weight: 80 kg     Relevant Results  Scheduled medications  atorvastatin, 40 mg, oral, Nightly  clopidogrel, 75 mg, oral, Daily  enoxaparin, 40 mg, subcutaneous, q24h  famotidine, 20 mg, oral, BID   Or  famotidine, 20 mg, intravenous, BID  fluticasone, 2 spray, Each Nostril, Daily  tiotropium, 2 Inhalation, inhalation, Daily   And  fluticasone furoate-vilanteroL, 1 puff, inhalation, " Daily  guaiFENesin, 600 mg, oral, BID  hydroCHLOROthiazide, 12.5 mg, oral, Daily  losartan, 50 mg, oral, Daily  metoprolol succinate XL, 200 mg, oral, Daily  nicotine, 1 patch, transdermal, Daily      Continuous medications  [Held by provider] sodium chloride 0.9%, 100 mL/hr, Last Rate: 100 mL/hr (02/12/24 0553)      PRN medications  PRN medications: acetaminophen **OR** [DISCONTINUED] acetaminophen **OR** [DISCONTINUED] acetaminophen, albuterol, melatonin, ondansetron **OR** ondansetron    Results for orders placed or performed during the hospital encounter of 02/11/24 (from the past 24 hour(s))   CBC   Result Value Ref Range    WBC 8.6 4.4 - 11.3 x10*3/uL    nRBC 0.0 0.0 - 0.0 /100 WBCs    RBC 4.86 4.50 - 5.90 x10*6/uL    Hemoglobin 15.3 13.5 - 17.5 g/dL    Hematocrit 46.8 41.0 - 52.0 %    MCV 96 80 - 100 fL    MCH 31.5 26.0 - 34.0 pg    MCHC 32.7 32.0 - 36.0 g/dL    RDW 13.1 11.5 - 14.5 %    Platelets 189 150 - 450 x10*3/uL   Basic Metabolic Panel   Result Value Ref Range    Glucose 97 74 - 99 mg/dL    Sodium 137 136 - 145 mmol/L    Potassium 4.0 3.5 - 5.3 mmol/L    Chloride 109 (H) 98 - 107 mmol/L    Bicarbonate 17 (L) 21 - 32 mmol/L    Anion Gap 15 10 - 20 mmol/L    Urea Nitrogen 16 6 - 23 mg/dL    Creatinine 0.93 0.50 - 1.30 mg/dL    eGFR >90 >60 mL/min/1.73m*2    Calcium 9.2 8.6 - 10.3 mg/dL     Electrocardiogram, 12-lead PRN ACS symptoms    Result Date: 2/13/2024  Normal sinus rhythm Normal ECG When compared with ECG of 11-FEB-2024 13:04, No significant change was found    ECG 12 lead    Result Date: 2/12/2024  Normal sinus rhythm Normal ECG No previous ECGs available See ED provider note for full interpretation and clinical correlation Confirmed by Aliya Coker ([id number]) on 2/12/2024 6:47:31 PM    MR brain wo IV contrast    Result Date: 2/12/2024  Interpreted By:  Charles Mcmillan, STUDY: MR BRAIN WO IV CONTRAST   INDICATION: Signs/Symptoms:Dizziness  Acute Nystagmus   COMPARISON:   Brain MRI 07/08/2016. Head  CT 02/11/2024   ACCESSION NUMBER(S): [id number]   ORDERING CLINICIAN: BOB FORDE   TECHNIQUE: Multi-planar multi-sequential MR imaging of the brain was performed without intravenous contrast.   FINDINGS:   2 punctate foci of diffusion restriction within the left parietal lobe (series 2, image 91) with mild associated FLAIR signal abnormality. No hemorrhage or mass effect.   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   Mild diffuse parenchymal volume loss without specific lobar pattern to suggest an underlying neurodegenerative process.   No hydrocephalus.  No extra-axial fluid collections.   The skull base flow voids are present.   The visualized intraorbital contents are normal.   The imaged portions of the paranasal sinuses are clear. Right mastoid effusion.   The visualized osseous structures, soft tissues and partially visualized parotid glands appear normal.       2 punctate foci of diffusion restriction in the left parietal lobe with mild associated FLAIR signal abnormality, which may represent small recent infarcts. No associated hemorrhage or mass effect.   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 Mild diffuse parenchymal volume loss without specific lobar pattern to suggest an underlying neurodegenerative process   Critical Finding:  Acute infarct. Notification was initiated on 2/12/2024 at 2:03 pm by  Charles Mcmillan.  (**-OCF-**)   Signed by: Charles Mcmillan 2/12/2024 2:03 PM Dictation workstation:   [id number]    US thyroid    Result Date: 2/12/2024  Interpreted By:  Tavares Larsen, STUDY: US THYROID;  2/12/2024 10:32 am   INDICATION: Signs/Symptoms:CT of Head showed Incidental right thyroid nodule, recommend comparison with prior outside imaging or nonemergent follow-up ultrasound for further.   COMPARISON: None.   ACCESSION NUMBER(S): [id number]   ORDERING CLINICIAN: BOB FORDE   TECHNIQUE: Multiple ultrasonographic  images of the thyroid gland were obtained. Representative images were submitted for interpretation.   FINDINGS:     RIGHT LOBE: Measures 5.2 x 2.8 x 3.1 cm. Slightly heterogenous echogenicity.   Nodule 1: Location: Right mid. Size: 2.2 x 2.1 x 1.8 cm. Composition: Almost completely solid (2) Echogenicity: Isoechoic (1). Shape: Wider than taller (0). Margins: Smooth (0). Echogenic foci: None.   Total points: 3. TI-RADS category: 3.   LEFT LOBE: Measures 4.2 x 2.1 x 1.6 cm. Slightly heterogenous echogenicity.   Nodule 2: Location: Left inferior. Size: 1.2 x 1.0 cm Composition: Almost completely solid (2) Echogenicity: Isoechoic (1). Shape: Taller than wider (3). Margins: Smooth (0). Echogenic foci: None.   Total points: 6. TI-RADS category: 4.   ISTHMUS: Measures 0.5 cm.       1. Right mid TI-RADS category 3 thyroid nodule measuring 2.2 cm. Per TI-RADS criteria and given size advise follow-up in 1, 3, 5 years. 2. Left inferior TI-RADS category 4 thyroid nodule measuring 1.2 cm. Per TI-RADS criteria and given size advise follow-up in 1, 2, 3, 5 years 3. Slightly heterogenous thyroid echotexture, nonspecific, however could be related to underlying chronic thyroid disease. Advise correlation with thyroid function tests.   Signed by: Tavares Larsen 2/12/2024 11:55 AM Dictation workstation:   [id number]    Carotid duplex bilateral    Result Date: 2/11/2024  Preliminary Cardiology Report           [place] [address]  Tel [phone] and Fax [phone]      Preliminary Vascular Lab Report  [facility] US CAROTID ARTERY DUPLEX BILATERAL  Patient Name:      DAWSON Salazar Physician:  [id number] Mj Montgomery MD Study Date:        2/11/2024    Ordering Physician: [id number] BOB FORDE MRN/PID:           [id number]     Technologist:       GAIL PA RDMS, T Accession#:        [id number] Technologist 2: Date of Birth/Age: 1960    Encounter#:         [id number] Gender:             M Admission Status:  Inpatient    Location Performed: [facility]  Diagnosis/ICD: Dizziness and giddiness-R42 Procedure/CPT: 11701 Cerebrovascular Carotid Duplex scan complete  Patient History: HTN. Patient also has history of daily ETOH use. Patient                  complains of dizziness and blurry vision since waking up today                  (the blurry vision is only when both eyes are open, vision is                  clear when only one eye is open). No known history of previous                  vasc surg/stent. Smoker:          Current, 1 pack per day.  PRELIMINARY CONCLUSIONS: Right Carotid: Findings are consistent with less than 50% stenosis of the right proximal internal carotid artery. Laminar flow seen by color Doppler. Right external carotid artery appears patent with no evidence of stenosis. The right vertebral artery is patent with antegrade flow. No evidence of hemodynamically significant stenosis in the right subclavian artery. Left Carotid: Findings are consistent with less than 50% stenosis of the left proximal internal carotid artery. Laminar flow seen by color Doppler. Left external carotid artery appears patent with no evidence of stenosis. The left vertebral artery is patent with antegrade flow. No evidence of hemodynamically significant stenosis in the left subclavian artery.  Imaging & Doppler Findings: Right Plaque Morph: The proximal right internal carotid artery demonstrates calcified plaque. The proximal right external carotid artery demonstrates calcified plaque. The distal right common carotid artery demonstrates calcified plaque. Left Plaque Morph: The proximal left internal carotid artery demonstrates calcified plaque. The proximal left external carotid artery demonstrates calcified plaque. The mid left common carotid artery demonstrates calcified plaque. The distal left common carotid artery demonstrates calcified plaque.   Right                        Left   PSV      EDV                 PSV      EDV 95 cm/s  14 cm/s   CCA P    65 cm/s  15 cm/s 69 cm/s  17 cm/s   CCA D    60 cm/s  14 cm/s 52 cm/s  18 cm/s   ICA P    65 cm/s  21 cm/s 59 cm/s  23 cm/s   ICA M    72 cm/s  26 cm/s 96 cm/s  36 cm/s   ICA D    49 cm/s  14 cm/s 115 cm/s 15 cm/s    ECA     107 cm/s 13 cm/s 41 cm/s  15 cm/s Vertebral  46 cm/s  14 cm/s 178 cm/s         Subclavian 120 cm/s                Right Left ICA/CCA Ratio  0.8  1.1   VASCULAR PRELIMINARY REPORT completed by GAIL PA on 2/11/2024 at 8:23:58 PM  ** Final **     CT angio neck    Result Date: 2/11/2024  Interpreted By:  Ramez Lane, STUDY: CT ANGIO NECK; CT ANGIO HEAD W AND WO IV CONTRAST;  2/11/2024 4:02 pm   INDICATION: Signs/Symptoms: eval stroke like s/s.   COMPARISON: Head CT, same day   ACCESSION NUMBER(S): [id number]; [id number]   ORDERING CLINICIAN: SONDRA ALLEN   TECHNIQUE: 75 mm Omnipaque 350 was administered intravenously and axial images of the head and neck were acquired. Coronal and sagittal MIPs and 3-D reconstructions were created on an independent workstation and provided for review. Imaging was repeated due to poor bolus timing; the final images were suboptimal but adequate for interpretation.   FINDINGS: CTA Head:   Anterior circulation: There is mural calcification and atherosclerotic irregularity of both intracranial internal carotid arteries, no significant narrowing. Proximal anterior and middle cerebral arteries are unremarkable. No aneurysm.   Posterior circulation: Normal variant fetal type right posterior cerebral artery. There is mural calcification and atherosclerotic irregularity of both V4 segments without significant narrowing. Question mild irregularity of the basilar artery, also without significant narrowing. The proximal posterior cerebral arteries are unremarkable. No aneurysm.   The dural venous sinuses are unremarkable.   CTA Neck:   Right carotid vessels: Partially calcified atherosclerotic plaque in the  common carotid artery, bifurcation, and proximal internal carotid artery without significant luminal narrowing.   Left carotid vessels: Partially calcified atherosclerotic plaque in the common carotid artery, bifurcation, and proximal internal carotid artery without significant luminal narrowing.   Vertebral vessels:  Partially calcified atherosclerotic plaque at the origins of both cervical vertebral arteries without significant narrowing. There is also partially calcified atherosclerotic along the left V2 segment without significant narrowing, the cervical vertebral arteries are otherwise unremarkable.   Heterogeneous nodule in the right lobe the thyroid measuring up to 1.8 cm in greatest diameter, the soft tissues of the neck are otherwise unremarkable. Centrilobular and paraseptal emphysematous changes in the visualized lungs, right greater than left with associated biapical scarring. The visualized lungs otherwise clear. Degenerative changes in the spine status post C5-7 ACDF. The patient is edentulous.       1. Limited exam, no high-grade narrowing or occlusion in the head or neck. 2. Incidental right thyroid nodule, recommend comparison with prior outside imaging or nonemergent follow-up ultrasound for further characterization.   MACRO: None   Signed by: Ramez Lane 2/11/2024 4:15 PM Dictation workstation:   [id number]    CT angio head w and wo IV contrast    Result Date: 2/11/2024  Interpreted By:  Ramez Lane, STUDY: CT ANGIO NECK; CT ANGIO HEAD W AND WO IV CONTRAST;  2/11/2024 4:02 pm   INDICATION: Signs/Symptoms: eval stroke like s/s.   COMPARISON: Head CT, same day   ACCESSION NUMBER(S): [id number]; [id number]   ORDERING CLINICIAN: OSNDRA ALLEN   TECHNIQUE: 75 mm Omnipaque 350 was administered intravenously and axial images of the head and neck were acquired. Coronal and sagittal MIPs and 3-D reconstructions were created on an independent workstation and provided for review. Imaging was  repeated due to poor bolus timing; the final images were suboptimal but adequate for interpretation.   FINDINGS: CTA Head:   Anterior circulation: There is mural calcification and atherosclerotic irregularity of both intracranial internal carotid arteries, no significant narrowing. Proximal anterior and middle cerebral arteries are unremarkable. No aneurysm.   Posterior circulation: Normal variant fetal type right posterior cerebral artery. There is mural calcification and atherosclerotic irregularity of both V4 segments without significant narrowing. Question mild irregularity of the basilar artery, also without significant narrowing. The proximal posterior cerebral arteries are unremarkable. No aneurysm.   The dural venous sinuses are unremarkable.   CTA Neck:   Right carotid vessels: Partially calcified atherosclerotic plaque in the common carotid artery, bifurcation, and proximal internal carotid artery without significant luminal narrowing.   Left carotid vessels: Partially calcified atherosclerotic plaque in the common carotid artery, bifurcation, and proximal internal carotid artery without significant luminal narrowing.   Vertebral vessels:  Partially calcified atherosclerotic plaque at the origins of both cervical vertebral arteries without significant narrowing. There is also partially calcified atherosclerotic along the left V2 segment without significant narrowing, the cervical vertebral arteries are otherwise unremarkable.   Heterogeneous nodule in the right lobe the thyroid measuring up to 1.8 cm in greatest diameter, the soft tissues of the neck are otherwise unremarkable. Centrilobular and paraseptal emphysematous changes in the visualized lungs, right greater than left with associated biapical scarring. The visualized lungs otherwise clear. Degenerative changes in the spine status post C5-7 ACDF. The patient is edentulous.       1. Limited exam, no high-grade narrowing or occlusion in the head or  neck. 2. Incidental right thyroid nodule, recommend comparison with prior outside imaging or nonemergent follow-up ultrasound for further characterization.   MACRO: None   Signed by: Ramez Lane 2/11/2024 4:15 PM Dictation workstation:   [id number]    XR chest 1 view    Result Date: 2/11/2024  Interpreted By:  Tomer Blanchard, STUDY: XR CHEST 1 VIEW;  2/11/2024 2:28 pm   INDICATION: Signs/Symptoms:cp.   COMPARISON: Two views of the chest, 3 May 2023   ACCESSION NUMBER(S): [id number]   ORDERING CLINICIAN: SONDRA ALLEN   TECHNIQUE: Single frontal view of the chest; Portable technique   FINDINGS:   LINES AND TUBES: n/a   HEART AND MEDIASTINUM: Heart size: Within normal limits Thoracic aorta: Within expected limits Ashley and nonvascular: within normal limits   PULMONARY VESSELS: Within normal limits; no sign of edema   LUNGS AND AIRWAYS: Clear; no acute airspace disease   PLEURA: Effusion: No substantial pleural effusion on either side Pneumothorax: No substantial pneumothorax on either side Other: n/a   BONES: No acute findings       NO ACUTE DISEASE IN THE CHEST   MACRO: None   Signed by: Tomer Blanchard 2/11/2024 2:32 PM Dictation workstation:   [id number]    CT head wo IV contrast    Result Date: 2/11/2024  Interpreted By:  Jessy Perkins, STUDY: CT HEAD WO IV CONTRAST;  2/11/2024 1:42 pm   INDICATION: Signs/Symptoms:HA blurry vision.   COMPARISON: None   ACCESSION NUMBER(S): [id number]   ORDERING CLINICIAN: SONDRA ALLEN   TECHNIQUE: Examination was performed in the axial plane using soft tissue and bone algorithm.   FINDINGS: INTRACRANIAL: There is prominence of the ventricular system and cerebral sulci consistent with cerebral atrophy. There are periventricular hypodensities consistent with  moderate small vessel disease. No mass or mass effect is identified. There is no hemorrhage or subdural fluid collection. There is no acute infarct. There is a small remote infarct involving the deep white matter of the  right posterior frontal region. There is a small remote infarct involving the genu of the left internal capsule.   EXTRACRANIAL: Visualized paranasal sinuses and mastoids are clear.       No acute intracranial pathology.   MACRO: None   Signed by: Jessy Perkins 2/11/2024 1:52 PM Dictation workstation:   [id number]        Assessment/Plan   Principal Problem:    Dizziness    64 yo gentleman admitted with Acute diplopia, blurred vision and dizziness in the setting of HTN urgency   MRI/MRA + CVA  CVA R/o  Chronic Daily headaches  Medication Noncompliance  ETOH use  HLD, untreated     Plan:  Echocardiogram pending to assess LVEF and Structural heart  BP remains suboptimally controlled   Continue Bystolic 20 mg daily - (Hospital Substitute is Metoprolol 200 mg daily)  Recommend adding Losartan-hydrochlorothiazide 50 mg-12.5 mg daily  Supportive treatment per primary team  Advise on smoking cessation and ETOH cessation     Further recommendations pending above results      Kiana Garay, JORGE-CNP

## 2024-02-13 NOTE — PROGRESS NOTES
I saw and evaluated the patient. I personally obtained the key and critical portions of the history and physical exam or was physically present for key and critical portions performed by the resident/fellow. I reviewed the resident/fellow's documentation and discussed the patient with the resident/fellow. I agree with the resident/fellow's medical decision making as documented in the note with the exception/addition of the following:  Probable lacunar stroke, agree with management outlined by Dr Wilson.  I was present for the entire visit.      Mirta An MD

## 2024-02-13 NOTE — DISCHARGE INSTRUCTIONS
Stroke Discharge Instructions    About this topic  A stroke is when the blood flow to your brain is changed. This changes how much oxygen your brain is getting. There are different ways a stroke can happen:  An ischemic stroke happens when a blood clot blocks or plugs one or more blood vessels in the brain. This stops blood flow and oxygen to a part of the brain and causes brain tissue to die.  A hemorrhagic stroke happens when a blood vessel breaks and bleeds into the brain. This stops normal blood flow and oxygen to the brain and causes brain tissue to be damaged and die.  A transient ischemic attack or TIA is when the blood flow to the brain stops for a short time. The blood flow then goes back to how it was before and the brain tissue does not die.  You may have problems in many ways with your normal activities. This is based on what part of your brain is affected by the stroke. You may have problems with how you move or your balance. You may have problems with being able to see, talk, eat, or swallow. You may not be able to remember things because of a stroke.    What care is needed at home?  Ask your doctor what you need to do when you go home. Make sure you ask questions if you do not understand what the doctor says. This way you will know what you need to do.  You may need help with your daily activities. These are things like taking a bath and getting dressed. You may also need help to cook, clean, and move about. You may need help with taking care of your money or business affairs. You may not be able to drive after having a stroke.  You may need changes in your home. These would make it simpler for you to move around or do normal activities. This can include things such as ramps for a wheelchair or extra railings to hold as you walk or sit down.  Your caregiver must take steps to keep your home safe for you to live there. This is very important if your stroke affected your memory or body.  You may need  help with exercises on specific body parts that were affected by the stroke. Your caregiver or a professional aide may have to help you with these.  What follow-up care is needed?  A person who has had a stroke needs to be watched closely. Your doctor may ask you to make visits to the office to check on your progress. Be sure to keep these visits. Have all blood tests and all other tests done as ordered by your doctor.  Your doctor may order rehab, which may include physical therapy, occupational therapy, and speech therapy. Rehab care may help you get back slowly to your daily activities. Be sure to keep all these visits as well.  What drugs may be needed?  The doctor may order drugs to:  Prevent blood clots  Control blood pressure  Lower cholesterol  Control blood sugar if you have diabetes  Control any heart rhythm problems  Will physical activity be limited?  Some activities, such as driving, may be limited for your safety. Talk to your doctor about what exact limits are needed based on how your stroke affected you.  What problems could happen?  Your speech and memory may be affected.  Your body or one side of your body may be paralyzed or weak. A part of your body may feel numb or have less sensation.  You may have problems with balance and coordination.  You may have bladder and bowel problems.  Your swallowing and eating may be affected.  You may have changes in your emotions, behavior, or judgment.  What can be done to prevent this health problem?  These steps are very helpful to prevent this problem:  If you are a smoker, stop smoking.  Limit alcohol intake.  Keep your weight normal. Being overweight raises your risk.  Keep your blood pressure under control.  Talk to your doctor about your cholesterol and blood sugar.  When do I need to call the doctor?  Activate the emergency medical system right away if you have signs of a heart attack or stroke. Call 911 in the [place] or [place]. The sooner  treatment begins, the better your chances for recovery. Call for emergency help right away if you have:  Signs of heart attack:  Chest pain  Trouble breathing  Fast heartbeat  Feeling dizzy  Signs of stroke:  Sudden numbness or weakness of the face, arm, or leg, especially on one side of the body  Sudden confusion, trouble speaking or understanding  Sudden trouble seeing in one or both eyes  Sudden trouble walking, dizziness, loss of balance or coordination  Sudden severe headache with no known cause  Call your doctor if you have:  Problems taking your drugs  Sores or redness on your skin  Trouble moving your bowels or emptying your bladder  Home care problems that you need help with  Teach Back: Helping You Understand  The Teach Back Method helps you understand the information we are giving you. After you talk with the staff, tell them in your own words what you learned. This helps to make sure the staff has described each thing clearly. It also helps to explain things that may have been confusing. Before going home, make sure you can do these:  I can tell you about my condition.  I can tell you what changes I need to make at home because of my stroke.  I can tell you what signs of a stroke are and what I will do if I have them.  Last Reviewed Date  2021-02-26

## 2024-02-13 NOTE — PROGRESS NOTES
Patient medically ready for discharge.  Patient follow up information on discharge instructions.  Patient will be returning home with no C needs.  IMM letter given and explained to patient earlier this morning. Consent/signature obtained and copy given to patient.  Patient is in agreement with discharge plan.  Coco Cho RN,[facility]

## 2024-02-14 ENCOUNTER — TELEPHONE (OUTPATIENT)
Dept: SURGICAL ONCOLOGY | Facility: HOSPITAL | Age: 64
End: 2024-02-14

## 2024-02-14 ENCOUNTER — PATIENT OUTREACH (OUTPATIENT)
Dept: PRIMARY CARE | Facility: CLINIC | Age: 64
End: 2024-02-14
Payer: COMMERCIAL

## 2024-02-14 ENCOUNTER — DOCUMENTATION (OUTPATIENT)
Dept: PRIMARY CARE | Facility: CLINIC | Age: 64
End: 2024-02-14
Payer: COMMERCIAL

## 2024-02-14 NOTE — DISCHARGE SUMMARY
Discharge Diagnosis  CVA (cerebral vascular accident) (CMS/[facility])    Issues Requiring Follow-Up  Follow up with PCP  Follow up with Neurology  Follow up with Cardiology  Holter monitor ordered as outpt     Discharge Meds     Your medication list        START taking these medications        Instructions Last Dose Given Next Dose Due   atorvastatin 40 mg tablet  Commonly known as: Lipitor      Take 1 tablet (40 mg) by mouth once daily at bedtime.       clopidogrel 75 mg tablet  Commonly known as: Plavix  Start taking on: February 14, 2024      Take 1 tablet (75 mg) by mouth once daily. Do not start before February 14, 2024.       hydroCHLOROthiazide 12.5 mg capsule  Commonly known as: Microzide  Start taking on: February 14, 2024      Take 1 capsule (12.5 mg) by mouth once daily. Do not start before February 14, 2024.       losartan 50 mg tablet  Commonly known as: Cozaar  Start taking on: February 14, 2024      Take 1 tablet (50 mg) by mouth once daily. Do not start before February 14, 2024.              CONTINUE taking these medications        Instructions Last Dose Given Next Dose Due   albuterol 0.63 mg/3 mL nebulizer solution           nebivolol 20 mg tablet  Commonly known as: Bystolic           Trelegy Ellipta 200-62.5-25 mcg blister with device  Generic drug: fluticasone-umeclidin-vilanter                     Where to Get Your Medications        These medications were sent to [id number] - [address] [address]      Phone: [phone]   atorvastatin 40 mg tablet  clopidogrel 75 mg tablet  hydroCHLOROthiazide 12.5 mg capsule  losartan 50 mg tablet         Test Results Pending At Discharge  Pending Labs       Order Current Status    myasthenia gravis panel; [facility]; myasthenia gravis panel - Miscellaneous Test In process            Hospital Course   Andrez Flores is a 63 y.o. male presented to [facility] ED from home.  Chief Complaint of  "Dizziness Acute Diplopia Chronic Headache.  He reported this morning he woke up and was very dizzy and had double vision. He reports daily chronic headache.  CT of Head Limited exam, no high-grade narrowing or occlusion in the head or neck No acute intracranial pathology. CXR  No acute disease of the chest.  Patient reports he \"cut back last month\" to 4 beers per day  Drug Screen Negative Patient endorses mild anxiety Hepatic Panel Ordered Ammonia Level Order Ordered Valium 5 mg po x 1 NOW. On exam patient resting in bed. Alert x 4. Patient states if  he opens his left eye he has double vision, nausea and dizziness. On exam patient left eye nystagmus noted.   Pupils Equal. Bilateral Hand  Equal. Patient having difficulty touching  his nose with  his eyes closed. Patient c/o headache. Current smoker 1ppd Requests Nicoderm Patch Denies Chest Pain, N/V/D SOB     Hospital Course:  #Left parietal lobe infarcts  #Hypertension  -CT head: 1. Limited exam, no high-grade narrowing or occlusion in the head or  neck.  2. Incidental right thyroid nodule, recommend comparison with prior  outside imaging or nonemergent follow-up ultrasound for further  characterization.  -MRI brain: 2 punctate foci of diffusion restriction in the left parietal lobe  with mild associated FLAIR signal abnormality, which may represent  small recent infarcts. No associated hemorrhage or mass effect.  Chronic right striatocapsular infarct. Moderate to severe  microangiopathic disease. Mild diffuse parenchymal volume loss  without specific lobar pattern to suggest an underlying  neurodegenerative process  -US thyroid: 1. Right mid TI-RADS category 3 thyroid nodule measuring 2.2 cm. Per  TI-RADS criteria and given size advise follow-up in 1, 3, 5 years.  2. Left inferior TI-RADS category 4 thyroid nodule measuring 1.2 cm.  Per TI-RADS criteria and given size advise follow-up in 1, 2, 3, 5  years  3. Slightly heterogenous thyroid echotexture, " nonspecific, however  could be related to underlying chronic thyroid disease. Advise  correlation with thyroid function tests.  -Lipid panel: Nini 199, HDL 38.8, , Trig 122  -Hgb A1C 5.2  -Echo 1. Left ventricular systolic function is normal with a 60-65% estimated ejection fraction.   2. Spectral Doppler shows an impaired relaxation pattern of left ventricular diastolic filling.  -Cardiac monitoring  -Cardiology consult, appreciate recs   -q4 neuro checks  -q4 vital signs  -Continue home nebivolol which is substituted for 200mg metoprolol XL inpatient  -Cardiology added losartan 50mg, hydrochlorothiazide 12.5mg for blood pressure management      #COPD, not in acute exacerbation  #Tobacco use disorder  #ETOH use disorder  -Encourage cessation  -Nicotine patch   -Continue mucinex, spiriva, breo, flonase     DVT ppx  -lovenox     F: PRN  E: Replete per protocol  N: Regular  A: PIV     Disposition: Pt stable to discharge home. Complex education given on the importance of follow up, medication compliance, and tobacco/etoh cessation. Zio patch ordered.     Code Status: Full Code    Total cumulative time spent in preparation of this discharge including documentation review, coordination of care with the medical team including PT/SW/care coordinators and treating consultants, discussion with patient and pertinent family members and finalization of prescriptions, followup appointments and this discharge summary was approximately  45 minutes. Over 50% of the time was spent face-to-face counseling the patient on diagnosis, prescriptions, and follow up appointments.     Pertinent Physical Exam At Time of Discharge  Physical Exam  HENT:      Head: Normocephalic.      Nose: Nose normal.      Mouth/Throat:      Pharynx: Oropharynx is clear.   Eyes:      Conjunctiva/sclera: Conjunctivae normal.   Cardiovascular:      Rate and Rhythm: Normal rate and regular rhythm.   Pulmonary:      Breath sounds: Decreased breath sounds  present.   Abdominal:      General: Bowel sounds are normal.      Palpations: Abdomen is soft.   Musculoskeletal:         General: Normal range of motion.      Cervical back: Normal range of motion and neck supple.   Skin:     General: Skin is dry.   Neurological:      General: No focal deficit present.      Mental Status: He is alert and oriented to person, place, and time.   Psychiatric:         Mood and Affect: Mood normal.         Behavior: Behavior normal.         Outpatient Follow-Up  Future Appointments   Date Time Provider Department Center   2/21/2024 12:45 PM DO Tavon Antoine [facility]   7/1/2024  9:00 AM DO Tavon Antoine [facility]         Michelle Doan, JORGE-CNP

## 2024-02-14 NOTE — SIGNIFICANT EVENT
Stroke Patient- TIA Follow-Up Call    Patient/Family knows what medications to take to prevent another event?  Yes    Patient/Family knows what lifestyle changes are needed to prevent another event?  Yes     Patient/Family can verbalize signs/symptoms of a stroke and calling 911? Yes    Patient/Family recommends our hospital's stroke program?  Yes    Any suggestions or comments for us to improve the care we provide to stroke patients and families? No

## 2024-02-14 NOTE — PROGRESS NOTES
Discharge Facility: [facility]   Discharge Diagnosis: CVA  Admission Date: 2/11/24  Discharge Date: 2/13/24    PCP Appointment Date: 2/21/24  Specialist Appointment Date: unknown  Hospital Encounter and Summary: Linked     See discharge assessment below for further details    Engagement  Call Start Time: 1435 (2/14/2024  2:41 PM)    Medications  Medications reviewed with patient/caregiver?: Yes (2/14/2024  2:41 PM)  Is the patient having any side effects they believe may be caused by any medication additions or changes?: No (2/14/2024  2:41 PM)  Prescription Comments: Start taking: Lipitor, Plavix, Hydrochlorothiazide, Losartan. (2/14/2024  2:41 PM)  Is the patient taking all medications as directed (includes completed medication regime)?: Yes (2/14/2024  2:41 PM)  Care Management Interventions: Provided patient education (2/14/2024  2:41 PM)  Medication Comments: Pt denies problems obtaining or affording medication (2/14/2024  2:41 PM)    Appointments  Does the patient have a primary care provider?: Yes (2/14/2024  2:41 PM)  Care Management Interventions: Verified appointment date/time/provider (2/14/2024  2:41 PM)  Has the patient kept scheduled appointments due by today?: Yes (2/14/2024  2:41 PM)  Care Management Interventions: Advised patient to keep appointment (2/14/2024  2:41 PM)    Self Management  What is the home health agency?: n/a (2/14/2024  2:41 PM)    Patient Teaching  Does the patient have access to their discharge instructions?: Yes (2/14/2024  2:41 PM)  Care Management Interventions: Reviewed instructions with patient (2/14/2024  2:41 PM)  What is the patient's perception of their health status since discharge?: Improving (2/14/2024  2:41 PM)  Is the patient/caregiver able to teach back the hierarchy of who to call/visit for symptoms/problems? PCP, Specialist, Home Health nurse, Urgent Care, ED, 911: Yes (2/14/2024  2:41 PM)      Harvey Garibay LPN

## 2024-02-18 LAB
ATRIAL RATE: 71 BPM
P AXIS: 77 DEGREES
P OFFSET: 200 MS
P ONSET: 147 MS
PR INTERVAL: 144 MS
Q ONSET: 219 MS
QRS COUNT: 12 BEATS
QRS DURATION: 78 MS
QT INTERVAL: 418 MS
QTC CALCULATION(BAZETT): 454 MS
QTC FREDERICIA: 442 MS
R AXIS: 38 DEGREES
T AXIS: 65 DEGREES
T OFFSET: 428 MS
VENTRICULAR RATE: 71 BPM

## 2024-02-21 ENCOUNTER — LAB (OUTPATIENT)
Dept: LAB | Facility: LAB | Age: 64
End: 2024-02-21
Payer: COMMERCIAL

## 2024-02-21 ENCOUNTER — OFFICE VISIT (OUTPATIENT)
Dept: PRIMARY CARE | Facility: CLINIC | Age: 64
End: 2024-02-21
Payer: COMMERCIAL

## 2024-02-21 VITALS
BODY MASS INDEX: 26.57 KG/M2 | TEMPERATURE: 97.7 F | WEIGHT: 179.4 LBS | HEIGHT: 69 IN | DIASTOLIC BLOOD PRESSURE: 60 MMHG | SYSTOLIC BLOOD PRESSURE: 114 MMHG | HEART RATE: 76 BPM | OXYGEN SATURATION: 97 %

## 2024-02-21 DIAGNOSIS — R53.83 TIRED: ICD-10-CM

## 2024-02-21 DIAGNOSIS — I63.9 CEREBROVASCULAR ACCIDENT (CVA), UNSPECIFIED MECHANISM (MULTI): Primary | ICD-10-CM

## 2024-02-21 DIAGNOSIS — R53.83 TIRED: Primary | ICD-10-CM

## 2024-02-21 DIAGNOSIS — I10 PRIMARY HYPERTENSION: ICD-10-CM

## 2024-02-21 DIAGNOSIS — Z72.0 TOBACCO USE: ICD-10-CM

## 2024-02-21 LAB
ALBUMIN SERPL BCP-MCNC: 4.2 G/DL (ref 3.4–5)
ALP SERPL-CCNC: 23 U/L (ref 33–136)
ALT SERPL W P-5'-P-CCNC: 22 U/L (ref 10–52)
ANION GAP SERPL CALC-SCNC: 10 MMOL/L (ref 10–20)
AST SERPL W P-5'-P-CCNC: 16 U/L (ref 9–39)
BASOPHILS # BLD AUTO: 0.11 X10*3/UL (ref 0–0.1)
BASOPHILS NFR BLD AUTO: 1.2 %
BILIRUB SERPL-MCNC: 0.5 MG/DL (ref 0–1.2)
BUN SERPL-MCNC: 21 MG/DL (ref 6–23)
CALCIUM SERPL-MCNC: 10.1 MG/DL (ref 8.6–10.3)
CHLORIDE SERPL-SCNC: 104 MMOL/L (ref 98–107)
CO2 SERPL-SCNC: 28 MMOL/L (ref 21–32)
CREAT SERPL-MCNC: 1.01 MG/DL (ref 0.5–1.3)
EGFRCR SERPLBLD CKD-EPI 2021: 83 ML/MIN/1.73M*2
EOSINOPHIL # BLD AUTO: 0.13 X10*3/UL (ref 0–0.7)
EOSINOPHIL NFR BLD AUTO: 1.4 %
ERYTHROCYTE [DISTWIDTH] IN BLOOD BY AUTOMATED COUNT: 12.8 % (ref 11.5–14.5)
GLUCOSE SERPL-MCNC: 107 MG/DL (ref 74–99)
HCT VFR BLD AUTO: 44.8 % (ref 41–52)
HGB BLD-MCNC: 14.7 G/DL (ref 13.5–17.5)
IMM GRANULOCYTES # BLD AUTO: 0.04 X10*3/UL (ref 0–0.7)
IMM GRANULOCYTES NFR BLD AUTO: 0.4 % (ref 0–0.9)
LYMPHOCYTES # BLD AUTO: 2.46 X10*3/UL (ref 1.2–4.8)
LYMPHOCYTES NFR BLD AUTO: 26.5 %
MCH RBC QN AUTO: 31.8 PG (ref 26–34)
MCHC RBC AUTO-ENTMCNC: 32.8 G/DL (ref 32–36)
MCV RBC AUTO: 97 FL (ref 80–100)
MONOCYTES # BLD AUTO: 0.85 X10*3/UL (ref 0.1–1)
MONOCYTES NFR BLD AUTO: 9.1 %
NEUTROPHILS # BLD AUTO: 5.71 X10*3/UL (ref 1.2–7.7)
NEUTROPHILS NFR BLD AUTO: 61.4 %
NRBC BLD-RTO: 0 /100 WBCS (ref 0–0)
PLATELET # BLD AUTO: 242 X10*3/UL (ref 150–450)
POTASSIUM SERPL-SCNC: 3.7 MMOL/L (ref 3.5–5.3)
PROT SERPL-MCNC: 6.7 G/DL (ref 6.4–8.2)
RBC # BLD AUTO: 4.62 X10*6/UL (ref 4.5–5.9)
SODIUM SERPL-SCNC: 138 MMOL/L (ref 136–145)
WBC # BLD AUTO: 9.3 X10*3/UL (ref 4.4–11.3)

## 2024-02-21 PROCEDURE — 3078F DIAST BP <80 MM HG: CPT | Performed by: FAMILY MEDICINE

## 2024-02-21 PROCEDURE — 3074F SYST BP LT 130 MM HG: CPT | Performed by: FAMILY MEDICINE

## 2024-02-21 PROCEDURE — 99214 OFFICE O/P EST MOD 30 MIN: CPT | Performed by: FAMILY MEDICINE

## 2024-02-21 PROCEDURE — 36415 COLL VENOUS BLD VENIPUNCTURE: CPT

## 2024-02-21 PROCEDURE — 82607 VITAMIN B-12: CPT

## 2024-02-21 RX ORDER — VARENICLINE TARTRATE 0.5 MG/1
TABLET, FILM COATED ORAL
Qty: 11 TABLET | Refills: 0 | Status: SHIPPED | OUTPATIENT
Start: 2024-02-21 | End: 2024-03-28

## 2024-02-21 RX ORDER — VARENICLINE TARTRATE 1 MG/1
1 TABLET, FILM COATED ORAL 2 TIMES DAILY
Qty: 60 TABLET | Refills: 5 | Status: SHIPPED | OUTPATIENT
Start: 2024-02-21

## 2024-02-21 ASSESSMENT — PAIN SCALES - GENERAL: PAINLEVEL: 3

## 2024-02-21 ASSESSMENT — PATIENT HEALTH QUESTIONNAIRE - PHQ9
SUM OF ALL RESPONSES TO PHQ9 QUESTIONS 1 AND 2: 0
1. LITTLE INTEREST OR PLEASURE IN DOING THINGS: NOT AT ALL
2. FEELING DOWN, DEPRESSED OR HOPELESS: NOT AT ALL

## 2024-02-21 NOTE — PROGRESS NOTES
"Subjective   Patient ID: Andrez Flores is a 63 y.o. male who presents for Hospital Follow-up ([facility]-double vision-Dx 2 strokes) and Fatigue.    HPI     Review of Systems    Objective   /60   Pulse 76   Temp 36.5 °C (97.7 °F)   Ht 1.753 m (5' 9\")   Wt 81.4 kg (179 lb 6.4 oz)   SpO2 97%   BMI 26.49 kg/m²     Physical Exam    Assessment/Plan   {Assess/PlanSmartLinks:57122}       "

## 2024-02-21 NOTE — PATIENT INSTRUCTIONS
PAIN MANAGEMENT IN ADULTS    What is pain? Pain is your body’s way to reacting to injury or illness. Everybody reacts to pain in different ways. What you think is painful may not be painful to someone else. But, pain is whatever you say it is!  What causes pain? Many things, such as an injury, surgery, or a disease can cause pain. Some pain is caused by pressure one a nerve, such as a cancerous tumor or slipped disc. Other pain is caused when nerves are cut as in an accident or surgery. After an injury or surgery you may not want to move the painful part of our body at all. But, you may have pain because you are not moving this body part. Sometimes there is no clear reason for your pain.    What are the different types of pain?  Acute pain is short?lived and lasts less than 3 months. Caregivers help first work to remove the cause of the pain, such as fixing a broken arm. Acute pain usually can be controlled or stopped with pain medicine.  Chronic pain lasts longer than 3?6 months. This kind of pain is often more complicated. Caregivers may use medicine along with other treatments, like self?hypnosis and relaxation to help you learn to deal with the chronic pain.  What is your pain like? Caregivers want you to talk to them about your pain. This helps them learn what may be causing the pain and how best to treat it.  Why is pain control important? Pain can affect your appetite, how well you sleep, your energy and your ability to do things. Pain can also affect your mood and relationship with others. If caregivers can help you control your pain, you will suffer less and can even heal faster.  How can pain medicine be given? Following are the many different ways pain medicine can be given depending on the kind of pain you are experiencing.  By mouth: you may be given pills or liquid to swallow or you may be given a pill or liquid to put under your tongue. Some medicine can also be given as a lozenge like a cough drop or  even a special lollipop.  Rectal: medicine in a suppository is put into your rectum.  Shot/Injection: pain medicine can be given as a shot/injection into an IV, into a muscle or under the skin  Topical: medicine in a cream or gel is spread over the skin.  Transdermal: medicine given in a patch and put onto the skin. This medicine is released slowly to give pain relief for as long as 72 hours.    How can you take pain medicine safely and make it work best for you? The following are many different ways pain medicine can be given depending on the kind of pain you have.  Some pain medicines can make you breathe less deeply and less often. The medicine may also make you sleepy, dizzy, and unsafe to drive a car or use heavy equipment. For these reasons, it is very important to follow your caregiver’s advice on how to use this medicine.  Sometimes the pain is worse when you first wake up in the morning. This may happened if you did not have enough pain medicine in your blood stream to last through the night. Caregivers may tell you to take a dose of pain medicine during the night.  Some foods, alcohol, and other medicines may cause unpleasant side effects when you take pain medicine. Follow your caregiver’s advice about how to prevent these problems.  Pain medicine can make you constipated. Straining with a BM can make you pain worse. Do not try to push the BM out if it’s too hard. Contact your caregiver if you become constipated.  Other non?drug pain control methods. There are many pain control techniques that can held you deal with pain even if it does not go away completely. It is important to practice some of the techniques when you don’t have pain if possible. This will help the technique work better during an attack of pain.  Cold and heat: both cold and heat can help lessen some types of post?op pain. Caregivers will tell you if cold and/or hot packs will help your pain.  Distraction: teaches you to focus your  attention on something other than pain. Playing cards or games, talking and visiting family may relax you and keep you from thinking about pain.  Hydrotherapy: is a gentle water exercise program. It can strengthen muscles that are not injured and lessen inflammation. Talk to your physical therapist or your caregiver about starting a hydrotherapy program.  Massage  Music  Physical therapy  Rest  Surgery/Nerve blocks  Call your caregiver if you have any of the following problems:  The pain medicine you are taking makes you sleepier than usual or confused  You have new pain or the pain seems different than before  You have constipation  Care agreement: You have the right to help plan your care. To help with this plan you must learn about your pain, what is causing it, and how it can be treated. You can then discuss treatment options with your caregivers. Work with them to decide what care will be used to treat you. You always have the right to refuse treatment.    [Well Developed] : well developed [Well Nourished] : well nourished [No Acute Distress] : no acute distress [Normal Conjunctiva] : normal conjunctiva [Normal Venous Pressure] : normal venous pressure [No Carotid Bruit] : no carotid bruit [Normal S1, S2] : normal S1, S2 [No Rub] : no rub [No Gallop] : no gallop [Clear Lung Fields] : clear lung fields [Good Air Entry] : good air entry [No Respiratory Distress] : no respiratory distress  [Soft] : abdomen soft [Non Tender] : non-tender [No Masses/organomegaly] : no masses/organomegaly [Normal Bowel Sounds] : normal bowel sounds [Normal Gait] : normal gait [No Cyanosis] : no cyanosis [No Clubbing] : no clubbing [No Varicosities] : no varicosities [No Rash] : no rash [No Skin Lesions] : no skin lesions [Moves all extremities] : moves all extremities [No Focal Deficits] : no focal deficits [Normal Speech] : normal speech [Alert and Oriented] : alert and oriented [Normal memory] : normal memory [de-identified] : 2/6 JAMIE MCCAULEY  [de-identified] : 1+ bilateral leg edema

## 2024-02-22 LAB — VIT B12 SERPL-MCNC: 279 PG/ML (ref 211–911)

## 2024-02-23 ENCOUNTER — PATIENT OUTREACH (OUTPATIENT)
Dept: PRIMARY CARE | Facility: CLINIC | Age: 64
End: 2024-02-23
Payer: COMMERCIAL

## 2024-02-23 ASSESSMENT — ENCOUNTER SYMPTOMS: FATIGUE: 1

## 2024-02-23 NOTE — PROGRESS NOTES
Unable to reach patient for call back after patient's follow up appointment with PCP.   LVM with call back number for patient to call if needed        Harvey Garibay LPN

## 2024-02-23 NOTE — PROGRESS NOTES
"Subjective   Patient ID: Andrez Flores is a 63 y.o. male who presents for Hospital Follow-up ([facility]-double vision-Dx 2 strokes) and Fatigue.    Primary hypertension  Tired  Tobacco use    Fatigue  Associated symptoms include fatigue. Pertinent negatives include no chest pain, fever, nausea or rash.        Review of Systems   Constitutional:  Positive for fatigue. Negative for fever.        Also see HPI   Eyes:  Negative for visual disturbance.   Respiratory:  Negative for shortness of breath.    Cardiovascular:  Negative for chest pain.   Gastrointestinal:  Negative for diarrhea and nausea.   Endocrine: Negative.    Genitourinary:  Negative for difficulty urinating.   Skin:  Negative for rash.   Neurological:  Negative for dizziness.        No focal deficits   Psychiatric/Behavioral:  Negative for suicidal ideas.    All other systems reviewed and are negative.      Objective   /60   Pulse 76   Temp 36.5 °C (97.7 °F)   Ht 1.753 m (5' 9\")   Wt 81.4 kg (179 lb 6.4 oz)   SpO2 97%   BMI 26.49 kg/m²     Physical Exam  Vitals and nursing note reviewed.   Constitutional:       Appearance: Normal appearance.   HENT:      Head: Normocephalic and atraumatic.   Eyes:      Extraocular Movements: Extraocular movements intact.      Conjunctiva/sclera: Conjunctivae normal.   Cardiovascular:      Rate and Rhythm: Normal rate and regular rhythm.      Heart sounds: Normal heart sounds.   Pulmonary:      Effort: Pulmonary effort is normal.      Breath sounds: Normal breath sounds.      Comments: Lungs essentially CTA b/l  Abdominal:      General: There is no distension.      Palpations: Abdomen is soft. There is no mass.      Tenderness: There is no abdominal tenderness.   Musculoskeletal:      Right lower leg: No edema.      Left lower leg: No edema.   Skin:     Coloration: Skin is not jaundiced.      Findings: No rash.   Neurological:      General: No focal deficit present.      Mental Status: He is alert and oriented to " person, place, and time.   Psychiatric:         Mood and Affect: Mood normal.         Behavior: Behavior normal.         Thought Content: Thought content normal.         Judgment: Judgment normal.         Assessment/Plan   Diagnoses and all orders for this visit:  Tired  -     Comprehensive Metabolic Panel; Future  -     CBC and Auto Differential; Future  -     Vitamin B12; Future  Primary hypertension  -     Comprehensive Metabolic Panel; Future  -     CBC and Auto Differential; Future  -     Vitamin B12; Future  Tobacco use  -     varenicline (Chantix) 1 mg tablet; Take 1 tablet (1 mg) by mouth 2 times a day. Start after finishing the initial 7 days of 0.5 mg dose .Take with full glass of water.  -     varenicline (Chantix) 0.5 mg tablet; Take 1 tablet (0.5 mg) by mouth once daily for 3 days, THEN 1 tablet (0.5 mg) 2 times a day for 4 days. Take with full glass of water..

## 2024-02-24 LAB — SCAN RESULT: NORMAL

## 2024-03-10 DIAGNOSIS — I63.9 CEREBROVASCULAR ACCIDENT (CVA), UNSPECIFIED MECHANISM (MULTI): ICD-10-CM

## 2024-03-13 ENCOUNTER — PATIENT OUTREACH (OUTPATIENT)
Dept: PRIMARY CARE | Facility: CLINIC | Age: 64
End: 2024-03-13
Payer: COMMERCIAL

## 2024-03-13 NOTE — PROGRESS NOTES
Unable to reach patient for one month post discharge follow up call.   LVM with call back number for patient to call if needed   If no voicemail available call attempts x 2 were made to contact the patient to assist with any questions or concerns patient may have.     Harvey Garibay LPN

## 2024-03-14 DIAGNOSIS — E53.8 VITAMIN B12 DEFICIENCY: Primary | ICD-10-CM

## 2024-03-14 RX ORDER — UBIDECARENONE 75 MG
500 CAPSULE ORAL DAILY
Qty: 30 TABLET | Refills: 11 | Status: SHIPPED | OUTPATIENT
Start: 2024-03-14 | End: 2024-04-03 | Stop reason: SDUPTHER

## 2024-03-15 ASSESSMENT — ENCOUNTER SYMPTOMS
ENDOCRINE NEGATIVE: 1
DIZZINESS: 0
NAUSEA: 0
FEVER: 0
DIFFICULTY URINATING: 0
DIARRHEA: 0
SHORTNESS OF BREATH: 0

## 2024-03-25 LAB — BODY SURFACE AREA: 1.97 M2

## 2024-03-25 RX ORDER — ATORVASTATIN CALCIUM 40 MG/1
40 TABLET, FILM COATED ORAL NIGHTLY
Qty: 30 TABLET | Refills: 1 | OUTPATIENT
Start: 2024-03-25

## 2024-03-28 ENCOUNTER — TELEMEDICINE (OUTPATIENT)
Dept: NEUROLOGY | Facility: HOSPITAL | Age: 64
End: 2024-03-28
Payer: COMMERCIAL

## 2024-03-28 DIAGNOSIS — Z86.73 HISTORY OF STROKE: Primary | ICD-10-CM

## 2024-03-28 PROBLEM — S33.9XXA SPRAIN, LUMBOSACRAL: Status: RESOLVED | Noted: 2023-08-23 | Resolved: 2024-03-28

## 2024-03-28 PROBLEM — S82.023A: Status: RESOLVED | Noted: 2023-09-05 | Resolved: 2024-03-28

## 2024-03-28 PROBLEM — S82.142A CLOSED BICONDYLAR FRACTURE OF LEFT TIBIAL PLATEAU: Status: RESOLVED | Noted: 2023-09-05 | Resolved: 2024-03-28

## 2024-03-28 PROCEDURE — 99442 PR PHYS/QHP TELEPHONE EVALUATION 11-20 MIN: CPT | Performed by: PSYCHIATRY & NEUROLOGY

## 2024-03-28 RX ORDER — FLUTICASONE FUROATE 27.5 UG/1
2 SPRAY, METERED NASAL
COMMUNITY

## 2024-03-28 NOTE — PROGRESS NOTES
Assessment/Plan:  Punctate infarcts in the left parietal lobe without sequela.  The lesions do not explain his symptoms which were non-specific.  Work up negative.  Continue plavix, atorvastatin, advised to quit smoking all together.  Follow up as needed.    Orders:  No orders of the defined types were placed in this encounter.      HPI:   Andrez Flores is a 63 y.o. right handed male with vascular risk factors of smoking, hypertension and hyperlipidemia,  who was admitted to [facility] on 2/11/24 with complaints of dizziness, diplopia and headache.  MRI brain revealed two punctate infarcts in the left parietal lobe, which do not explain his symptoms.  He is allergic to aspirin and was treated with plavix and atorvastatin after echo unremarkable.  He has no residual symptoms.    MRI: two small punctate diffusion lesions in the left parietal lobe  MRA: non-flow limiting atherosclerosis in the bilateral carotid bifurcations  Echo: Ejection fraction: 60-65%, impaired relaxation            Left Atrium: normal            Patent foramen ovale: no bubble study  LDL: 136  HbA1C: 5.2  Cardiac Monitor: no Atrial fibrillation     Medications:   Current Outpatient Medications:     albuterol 0.63 mg/3 mL nebulizer solution, Take 3 mL (0.63 mg) by nebulization every 6 hours if needed for wheezing., Disp: , Rfl:     albuterol 90 mcg/actuation inhaler, INHALE 2 PUFFS EVERY 4 HOURS AS NEEDED FOR SHORTNESS OF BREATH, Disp: 8.5 g, Rfl: 3    atorvastatin (Lipitor) 40 mg tablet, Take 1 tablet (40 mg) by mouth once daily at bedtime., Disp: 30 tablet, Rfl: 1    clopidogrel (Plavix) 75 mg tablet, Take 1 tablet (75 mg) by mouth once daily. Do not start before February 14, 2024., Disp: 30 tablet, Rfl: 1    cyanocobalamin (Vitamin B-12) 500 mcg tablet, Take 1 tablet (500 mcg) by mouth once daily., Disp: 30 tablet, Rfl: 11    fluticasone-umeclidin-vilanter (Trelegy Ellipta) 200-62.5-25 mcg blister with device, Inhale 1 puff once every 24  hours., Disp: , Rfl:     hydroCHLOROthiazide (Microzide) 12.5 mg capsule, Take 1 capsule (12.5 mg) by mouth once daily. Do not start before February 14, 2024., Disp: 30 capsule, Rfl: 1    losartan (Cozaar) 50 mg tablet, Take 1 tablet (50 mg) by mouth once daily. Do not start before February 14, 2024., Disp: 30 tablet, Rfl: 1    nebivolol (Bystolic) 20 mg tablet, Take 1 tablet (20 mg) by mouth once daily., Disp: , Rfl:     varenicline (Chantix) 0.5 mg tablet, Take 1 tablet (0.5 mg) by mouth once daily for 3 days, THEN 1 tablet (0.5 mg) 2 times a day for 4 days. Take with full glass of water.., Disp: 11 tablet, Rfl: 0    fluticasone (Flonase Sensimist) 27.5 mcg/actuation nasal spray, Administer 2 sprays into each nostril once daily., Disp: , Rfl:     varenicline (Chantix) 1 mg tablet, Take 1 tablet (1 mg) by mouth 2 times a day. Start after finishing the initial 7 days of 0.5 mg dose .Take with full glass of water., Disp: 60 tablet, Rfl: 5     PMH:   Past Medical History:   Diagnosis Date    Hypertension     Sprain, lumbosacral 08/23/2023    Stroke (CMS/[facility])         PSH:   Past Surgical History:   Procedure Laterality Date    KNEE Left 2018    knee surgery    NECK SURGERY      WRIST SURGERY Left         Allergies:   Allergies   Allergen Reactions    Aspirin Unknown    Naproxen Unknown        FH:   Family History   Problem Relation Name Age of Onset    Hypothyroidism Mother      Other (mva) Father      Heart attack Daughter      Heart disease Brother      Heart disease Brother      Seizures Daughter      No Known Problems Daughter      No Known Problems Son      Parkinsonism Maternal Grandfather          SH:   Social History     Socioeconomic History    Marital status:      Spouse name: new,antonio    Number of children: 3    Years of education: Not on file    Highest education level: Not on file   Occupational History    Occupation: retired   Tobacco Use    Smoking status: Every Day     Packs/day: 0.50      Years: 50.00     Additional pack years: 0.00     Total pack years: 25.00     Types: Cigarettes     Passive exposure: Current    Smokeless tobacco: Never   Vaping Use    Vaping Use: Never used   Substance and Sexual Activity    Alcohol use: Yes     Alcohol/week: 21.0 standard drinks of alcohol     Types: 21 Cans of beer per week     Comment: 2-3 daily    Drug use: Never    Sexual activity: Not on file   Other Topics Concern    Not on file   Social History Narrative    Not on file     Social Determinants of Health     Financial Resource Strain: Low Risk  (2024)    Overall Financial Resource Strain (CARDIA)     Difficulty of Paying Living Expenses: Not very hard   Food Insecurity: No Food Insecurity (2024)    Hunger Vital Sign     Worried About Running Out of Food in the Last Year: Never true     Ran Out of Food in the Last Year: Never true   Transportation Needs: No Transportation Needs (2024)    PRAPARE - Transportation     Lack of Transportation (Medical): No     Lack of Transportation (Non-Medical): No   Physical Activity: Not on file   Stress: Not on file   Social Connections: Not on file   Intimate Partner Violence: Not on file   Housing Stability: Low Risk  (2024)    Housing Stability Vital Sign     Unable to Pay for Housing in the Last Year: No     Number of Places Lived in the Last Year: 1     Unstable Housing in the Last Year: No        Objective:    There were no vitals taken for this visit.     Neurologic Exam:    Exam limited by phone visit.  The patient was alert, attentive and appropriate during the phone call.  He answered questions briskly and accurately.    Barthel Index:  Feeding: 10=independent  Dressing: 10=independent  Groomin=independent  Bathin=independent  Transfers: 15=independent  Mobility: 15=independent 50 yards  Stairs: 10=independent  Toileting: 10=independent  Bladder: 10=continent  Bowels: 10=continent    Total Score: 100    Modified [place] Score:  0=no  symptoms    NIHSS:  unable to complete due to phone visit.  Level of Consciousness: 0=alert      LOC questions: 0=answers both questions        Extensive review of notes in EMR, labs, tests, Interpretation of neuroimaging as documented in the HPI or Assessment and Plan.  Patient consented to the following:  This is a telehealth visit which has the benefit of avoiding travel and limiting exposure to Covid-19. Telehealth visits are not being recorded and personal health information is protected.  This visit will be billed to your insurance.  There are limitations to my ability to examine you and it is possible  that I may decide you need an in person appointment.  Is it ok to continue.  Note: patient does not have smart phone or computer with camera.  Unable to do video.

## 2024-03-28 NOTE — PATIENT INSTRUCTIONS
Stroke prevention:  Eat a healthy diet with plenty of fresh fruits and vegetables.  Avoid salt and fried foods.  Lose weight and exercise on a regular basis.  Do not smoke or use drugs.  Be sure to take all medications.  Call 911 for signs of stroke (numbness or weakness on one side, trouble seeing on one side, trouble speaking (either because your speech is slurred or you can't find the words), sudden double vision, spinning sensation or loss of coordination).    To find a stroke support group: https://www.stroke.org/en/stroke-support-group-finder  To learn about Virtual Stroke Educations sessions: contact Judy Ward at: [email or web address]    I can be reached at phone: [phone] or email: [email or web address]

## 2024-03-29 ENCOUNTER — OFFICE VISIT (OUTPATIENT)
Dept: CARDIOLOGY | Facility: CLINIC | Age: 64
End: 2024-03-29
Payer: COMMERCIAL

## 2024-03-29 VITALS
BODY MASS INDEX: 26.94 KG/M2 | SYSTOLIC BLOOD PRESSURE: 118 MMHG | WEIGHT: 182.4 LBS | DIASTOLIC BLOOD PRESSURE: 70 MMHG | OXYGEN SATURATION: 100 % | HEART RATE: 75 BPM

## 2024-03-29 DIAGNOSIS — E78.5 HYPERLIPIDEMIA, UNSPECIFIED HYPERLIPIDEMIA TYPE: Primary | ICD-10-CM

## 2024-03-29 DIAGNOSIS — I10 PRIMARY HYPERTENSION: ICD-10-CM

## 2024-03-29 DIAGNOSIS — I63.9 CEREBROVASCULAR ACCIDENT (CVA), UNSPECIFIED MECHANISM (MULTI): ICD-10-CM

## 2024-03-29 PROCEDURE — 3074F SYST BP LT 130 MM HG: CPT | Performed by: NURSE PRACTITIONER

## 2024-03-29 PROCEDURE — 3078F DIAST BP <80 MM HG: CPT | Performed by: NURSE PRACTITIONER

## 2024-03-29 PROCEDURE — 99214 OFFICE O/P EST MOD 30 MIN: CPT | Performed by: NURSE PRACTITIONER

## 2024-03-29 RX ORDER — CLOPIDOGREL BISULFATE 75 MG/1
75 TABLET ORAL DAILY
Qty: 90 TABLET | Refills: 3 | Status: SHIPPED | OUTPATIENT
Start: 2024-03-29 | End: 2025-03-29

## 2024-03-29 RX ORDER — HYDROCHLOROTHIAZIDE 12.5 MG/1
12.5 CAPSULE ORAL DAILY
Qty: 90 CAPSULE | Refills: 3 | Status: SHIPPED | OUTPATIENT
Start: 2024-03-29 | End: 2025-03-29

## 2024-03-29 RX ORDER — ATORVASTATIN CALCIUM 40 MG/1
40 TABLET, FILM COATED ORAL NIGHTLY
Qty: 90 TABLET | Refills: 3 | Status: SHIPPED | OUTPATIENT
Start: 2024-03-29 | End: 2025-03-29

## 2024-03-29 RX ORDER — LOSARTAN POTASSIUM 50 MG/1
50 TABLET ORAL DAILY
Qty: 90 TABLET | Refills: 3 | Status: SHIPPED | OUTPATIENT
Start: 2024-03-29 | End: 2025-03-29

## 2024-03-29 NOTE — LETTER
March 29, 2024     Chad Bynum DO  [address] [facility] [address]    Patient: Andrez Flores   YOB: 1960   Date of Visit: 3/29/2024       Dear Dr. Chad Bynum DO:    Thank you for referring Andrez Flores to me for evaluation. Below are my notes for this consultation.  If you have questions, please do not hesitate to call me. I look forward to following your patient along with you.       Sincerely,     Lesly Gonzalez, APRN-CNP      CC: No Recipients  ______________________________________________________________________________________    Primary Care Physician: Chad Bynum DO  Primary Cardiologist:       Date of Visit: 03/29/2024  9:20 AM EDT  Location of visit:  [address]   Type of Visit: Follow up             Chief Complaint   Patient presents with   • New Patient Visit     Go over monitor and echo       HPI / Summary:   Andrez Flores is a 63 y.o. male  with HTN, HLD, smoking and CVA presents for hospital follow up     Hospitalized February 2024 with CVA. Echo showed preserved LV systolic function.  Extended Holter was negative for Atrial fibrillation   Taking all Rx and tolerating well   Taking chantix for smoking cessation     12 system review is negative except as noted above         Medical History:   Past Medical History:   Diagnosis Date   • Hypertension    • Sprain, lumbosacral 08/23/2023   • Stroke (CMS/HCC)        Social History:   Tobacco Use: High Risk (3/29/2024)    Patient History    • Smoking Tobacco Use: Every Day    • Smokeless Tobacco Use: Never    • Passive Exposure: Current         MEDICATIONS:   Current Outpatient Medications   Medication Instructions   • albuterol 90 mcg/actuation inhaler 2 puffs, inhalation, As needed   • albuterol 0.63 mg, nebulization, Every 6 hours PRN   • atorvastatin (LIPITOR) 40 mg, oral, Nightly   • clopidogrel (PLAVIX) 75 mg, oral, Daily   • cyanocobalamin (VITAMIN B-12) 500 mcg, oral, Daily   • fluticasone (Flonase Sensimist) 27.5 mcg/actuation nasal  spray 2 sprays, Each Nostril, Daily RT   • fluticasone-umeclidin-vilanter (Trelegy Ellipta) 200-62.5-25 mcg blister with device 1 puff, inhalation, Every 24 hours   • hydroCHLOROthiazide (MICROZIDE) 12.5 mg, oral, Daily   • losartan (COZAAR) 50 mg, oral, Daily   • nebivolol (BYSTOLIC) 20 mg, oral, Daily   • varenicline (Chantix) 0.5 mg tablet Take 1 tablet (0.5 mg) by mouth once daily for 3 days, THEN 1 tablet (0.5 mg) 2 times a day for 4 days. Take with full glass of water..   • varenicline (CHANTIX) 1 mg, oral, 2 times daily, Start after finishing the initial 7 days of 0.5 mg dose .Take with full glass of water.         IMAGING REVIEWED:     Echocardiogram 2/12/2014  CONCLUSIONS:   1. Left ventricular systolic function is normal with a 60-65% estimated ejection fraction.   2. Spectral Doppler shows an impaired relaxation pattern of left ventricular diastolic filling.           LABS:  CBC with Differential:    Lab Results   Component Value Date    WBC 9.3 02/21/2024    RBC 4.62 02/21/2024    HGB 14.7 02/21/2024    HCT 44.8 02/21/2024     02/21/2024    MCV 97 02/21/2024    MCH 31.8 02/21/2024    MCHC 32.8 02/21/2024    RDW 12.8 02/21/2024    NRBC 0.0 02/21/2024    LYMPHOPCT 26.5 02/21/2024    MONOPCT 9.1 02/21/2024    EOSPCT 1.4 02/21/2024    BASOPCT 1.2 02/21/2024    MONOSABS 0.85 02/21/2024    LYMPHSABS 2.46 02/21/2024    EOSABS 0.13 02/21/2024    BASOSABS 0.11 (H) 02/21/2024     CMP:    Lab Results   Component Value Date     02/21/2024    K 3.7 02/21/2024     02/21/2024    CO2 28 02/21/2024    BUN 21 02/21/2024    CREATININE 1.01 02/21/2024    GLUCOSE 107 (H) 02/21/2024    PROT 6.7 02/21/2024    CALCIUM 10.1 02/21/2024    BILITOT 0.5 02/21/2024    ALKPHOS 23 (L) 02/21/2024    AST 16 02/21/2024    ALT 22 02/21/2024     BMP:    Lab Results   Component Value Date     02/21/2024    K 3.7 02/21/2024     02/21/2024    CO2 28 02/21/2024    BUN 21 02/21/2024    CREATININE 1.01 02/21/2024     "CALCIUM 10.1 02/21/2024    GLUCOSE 107 (H) 02/21/2024     Magnesium:  Lab Results   Component Value Date    MG 2.01 02/11/2024     Troponin:    Lab Results   Component Value Date    TROPHS 5 02/11/2024     BNP: No results found for: \"BNP\"      Lipid Panel:  Lab Results   Component Value Date    HDL 38.8 02/12/2024    CHHDL 5.1 02/12/2024    VLDL 24 02/12/2024    TRIG 122 02/12/2024    NHDL 160 (H) 02/12/2024        Lab work and imaging results independently reviewed by me     Visit Vitals  /70   Pulse 75   Wt 82.7 kg (182 lb 6.4 oz)   SpO2 100%   BMI 26.94 kg/m²   Smoking Status Every Day   BSA 2.01 m²         ECG dated 2/11/20424 independently reviewed   NSR 71        Constitutional:       Appearance: Healthy appearance. Not in distress.   Eyes:      Conjunctiva/sclera: Conjunctivae normal.   Neck:      Vascular: JVD normal.   Pulmonary:      Effort: Pulmonary effort is normal.      Breath sounds: Normal breath sounds.   Cardiovascular:      PMI at left midclavicular line. Normal rate. Regular rhythm. Normal S1. Normal S2.       Murmurs: There is no murmur.      No rub.   Pulses:     Intact distal pulses.   Edema:     Peripheral edema absent.   Abdominal:      General: Bowel sounds are normal.   Musculoskeletal:      Cervical back: Neck supple. Skin:     General: Skin is warm and dry.   Neurological:      Mental Status: Alert and oriented to person, place and time.           Problem List Items Addressed This Visit             ICD-10-CM    Hypertension I10    Relevant Medications    losartan (Cozaar) 50 mg tablet    hydroCHLOROthiazide (Microzide) 12.5 mg capsule    CVA (cerebral vascular accident) (CMS/[facility]) I63.9    Relevant Medications    atorvastatin (Lipitor) 40 mg tablet    clopidogrel (Plavix) 75 mg tablet    Hyperlipidemia - Primary E78.5         BP is in acceptable range on current RX which He is tolerating well and agrees to continue   -- Refilled     Most recent  mg/dL.  No hx of ischemic " heart disease  --Atorvastatin 40 mg HS   --Clopidogrel in lieu of ASA     F/U with PCP  Call cardiology as needed         03/29/24 at 9:33 AM - JORGE Kemp-CNP      Orders:  No orders of the defined types were placed in this encounter.        Followup Appts:  Future Appointments   Date Time Provider Department Center   4/3/2024  2:45 PM DO [facility]av[facility] Antoine   7/1/2024  9:00 AM DO [facility]

## 2024-03-29 NOTE — PROGRESS NOTES
Primary Care Physician: Chad Bynum DO  Primary Cardiologist:       Date of Visit: 03/29/2024  9:20 AM EDT  Location of visit:  [address]   Type of Visit: Follow up             Chief Complaint   Patient presents with    New Patient Visit     Go over monitor and echo       HPI / Summary:   Andrez Flores is a 63 y.o. male  with HTN, HLD, smoking and CVA presents for hospital follow up     Hospitalized February 2024 with CVA. Echo showed preserved LV systolic function.  Extended Holter was negative for Atrial fibrillation   Taking all Rx and tolerating well   Taking chantix for smoking cessation     12 system review is negative except as noted above         Medical History:   Past Medical History:   Diagnosis Date    Hypertension     Sprain, lumbosacral 08/23/2023    Stroke (CMS/HCC)        Social History:   Tobacco Use: High Risk (3/29/2024)    Patient History     Smoking Tobacco Use: Every Day     Smokeless Tobacco Use: Never     Passive Exposure: Current         MEDICATIONS:   Current Outpatient Medications   Medication Instructions    albuterol 90 mcg/actuation inhaler 2 puffs, inhalation, As needed    albuterol 0.63 mg, nebulization, Every 6 hours PRN    atorvastatin (LIPITOR) 40 mg, oral, Nightly    clopidogrel (PLAVIX) 75 mg, oral, Daily    cyanocobalamin (VITAMIN B-12) 500 mcg, oral, Daily    fluticasone (Flonase Sensimist) 27.5 mcg/actuation nasal spray 2 sprays, Each Nostril, Daily RT    fluticasone-umeclidin-vilanter (Trelegy Ellipta) 200-62.5-25 mcg blister with device 1 puff, inhalation, Every 24 hours    hydroCHLOROthiazide (MICROZIDE) 12.5 mg, oral, Daily    losartan (COZAAR) 50 mg, oral, Daily    nebivolol (BYSTOLIC) 20 mg, oral, Daily    varenicline (Chantix) 0.5 mg tablet Take 1 tablet (0.5 mg) by mouth once daily for 3 days, THEN 1 tablet (0.5 mg) 2 times a day for 4 days. Take with full glass of water..    varenicline (CHANTIX) 1 mg, oral, 2 times daily, Start after finishing the initial 7 days of  "0.5 mg dose .Take with full glass of water.         IMAGING REVIEWED:     Echocardiogram 2/12/2014  CONCLUSIONS:   1. Left ventricular systolic function is normal with a 60-65% estimated ejection fraction.   2. Spectral Doppler shows an impaired relaxation pattern of left ventricular diastolic filling.           LABS:  CBC with Differential:    Lab Results   Component Value Date    WBC 9.3 02/21/2024    RBC 4.62 02/21/2024    HGB 14.7 02/21/2024    HCT 44.8 02/21/2024     02/21/2024    MCV 97 02/21/2024    MCH 31.8 02/21/2024    MCHC 32.8 02/21/2024    RDW 12.8 02/21/2024    NRBC 0.0 02/21/2024    LYMPHOPCT 26.5 02/21/2024    MONOPCT 9.1 02/21/2024    EOSPCT 1.4 02/21/2024    BASOPCT 1.2 02/21/2024    MONOSABS 0.85 02/21/2024    LYMPHSABS 2.46 02/21/2024    EOSABS 0.13 02/21/2024    BASOSABS 0.11 (H) 02/21/2024     CMP:    Lab Results   Component Value Date     02/21/2024    K 3.7 02/21/2024     02/21/2024    CO2 28 02/21/2024    BUN 21 02/21/2024    CREATININE 1.01 02/21/2024    GLUCOSE 107 (H) 02/21/2024    PROT 6.7 02/21/2024    CALCIUM 10.1 02/21/2024    BILITOT 0.5 02/21/2024    ALKPHOS 23 (L) 02/21/2024    AST 16 02/21/2024    ALT 22 02/21/2024     BMP:    Lab Results   Component Value Date     02/21/2024    K 3.7 02/21/2024     02/21/2024    CO2 28 02/21/2024    BUN 21 02/21/2024    CREATININE 1.01 02/21/2024    CALCIUM 10.1 02/21/2024    GLUCOSE 107 (H) 02/21/2024     Magnesium:  Lab Results   Component Value Date    MG 2.01 02/11/2024     Troponin:    Lab Results   Component Value Date    TROPHS 5 02/11/2024     BNP: No results found for: \"BNP\"      Lipid Panel:  Lab Results   Component Value Date    HDL 38.8 02/12/2024    CHHDL 5.1 02/12/2024    VLDL 24 02/12/2024    TRIG 122 02/12/2024    NHDL 160 (H) 02/12/2024        Lab work and imaging results independently reviewed by me     Visit Vitals  /70   Pulse 75   Wt 82.7 kg (182 lb 6.4 oz)   SpO2 100%   BMI 26.94 kg/m² "   Smoking Status Every Day   BSA 2.01 m²         ECG dated 2/11/20424 independently reviewed   NSR 71        Constitutional:       Appearance: Healthy appearance. Not in distress.   Eyes:      Conjunctiva/sclera: Conjunctivae normal.   Neck:      Vascular: JVD normal.   Pulmonary:      Effort: Pulmonary effort is normal.      Breath sounds: Normal breath sounds.   Cardiovascular:      PMI at left midclavicular line. Normal rate. Regular rhythm. Normal S1. Normal S2.       Murmurs: There is no murmur.      No rub.   Pulses:     Intact distal pulses.   Edema:     Peripheral edema absent.   Abdominal:      General: Bowel sounds are normal.   Musculoskeletal:      Cervical back: Neck supple. Skin:     General: Skin is warm and dry.   Neurological:      Mental Status: Alert and oriented to person, place and time.           Problem List Items Addressed This Visit             ICD-10-CM    Hypertension I10    Relevant Medications    losartan (Cozaar) 50 mg tablet    hydroCHLOROthiazide (Microzide) 12.5 mg capsule    CVA (cerebral vascular accident) (CMS/HCC) I63.9    Relevant Medications    atorvastatin (Lipitor) 40 mg tablet    clopidogrel (Plavix) 75 mg tablet    Hyperlipidemia - Primary E78.5         BP is in acceptable range on current RX which He is tolerating well and agrees to continue   -- Refilled     Most recent  mg/dL.  No hx of ischemic heart disease  --Atorvastatin 40 mg HS   --Clopidogrel in lieu of ASA     F/U with PCP  Call cardiology as needed         03/29/24 at 9:33 AM - JORGE Kemp-CNP      Orders:  No orders of the defined types were placed in this encounter.        Followup Appts:  Future Appointments   Date Time Provider Department Center   4/3/2024  2:45 PM DO Tavon Antoine   7/1/2024  9:00 AM DO Tavon Antoine

## 2024-04-03 ENCOUNTER — OFFICE VISIT (OUTPATIENT)
Dept: PRIMARY CARE | Facility: CLINIC | Age: 64
End: 2024-04-03
Payer: COMMERCIAL

## 2024-04-03 VITALS
WEIGHT: 180.6 LBS | TEMPERATURE: 98.4 F | BODY MASS INDEX: 26.75 KG/M2 | DIASTOLIC BLOOD PRESSURE: 60 MMHG | HEIGHT: 69 IN | HEART RATE: 78 BPM | OXYGEN SATURATION: 97 % | SYSTOLIC BLOOD PRESSURE: 120 MMHG

## 2024-04-03 DIAGNOSIS — E53.8 VITAMIN B12 DEFICIENCY: Primary | ICD-10-CM

## 2024-04-03 PROCEDURE — 3074F SYST BP LT 130 MM HG: CPT | Performed by: FAMILY MEDICINE

## 2024-04-03 PROCEDURE — 99213 OFFICE O/P EST LOW 20 MIN: CPT | Performed by: FAMILY MEDICINE

## 2024-04-03 PROCEDURE — 3078F DIAST BP <80 MM HG: CPT | Performed by: FAMILY MEDICINE

## 2024-04-03 RX ORDER — UBIDECARENONE 75 MG
500 CAPSULE ORAL DAILY
Qty: 90 TABLET | Refills: 3 | Status: SHIPPED | OUTPATIENT
Start: 2024-04-03 | End: 2025-04-03

## 2024-04-03 ASSESSMENT — PATIENT HEALTH QUESTIONNAIRE - PHQ9
1. LITTLE INTEREST OR PLEASURE IN DOING THINGS: NOT AT ALL
2. FEELING DOWN, DEPRESSED OR HOPELESS: NOT AT ALL
SUM OF ALL RESPONSES TO PHQ9 QUESTIONS 1 AND 2: 0

## 2024-04-03 ASSESSMENT — PAIN SCALES - GENERAL: PAINLEVEL: 0-NO PAIN

## 2024-04-03 NOTE — PROGRESS NOTES
"Subjective   Patient ID: Andrez Flores is a 63 y.o. male who presents for Hypertension.    Fatigue  Stable hypertension  Vitamin B12 deficiency         Review of Systems   Constitutional:  Negative for fever.        Also see HPI   Eyes:  Negative for visual disturbance.   Respiratory:  Negative for shortness of breath.    Cardiovascular:  Negative for chest pain.   Gastrointestinal:  Negative for diarrhea and nausea.   Endocrine: Negative.    Genitourinary:  Negative for difficulty urinating.   Skin:  Negative for rash.   Neurological:  Negative for dizziness.        No focal deficits   Psychiatric/Behavioral:  Negative for suicidal ideas.    All other systems reviewed and are negative.      Objective   /60   Pulse 78   Temp 36.9 °C (98.4 °F)   Ht 1.753 m (5' 9\")   Wt 81.9 kg (180 lb 9.6 oz)   SpO2 97%   BMI 26.67 kg/m²     Physical Exam  Vitals and nursing note reviewed.   Constitutional:       Appearance: Normal appearance.   HENT:      Head: Normocephalic and atraumatic.   Eyes:      Extraocular Movements: Extraocular movements intact.      Conjunctiva/sclera: Conjunctivae normal.   Cardiovascular:      Rate and Rhythm: Normal rate and regular rhythm.      Heart sounds: Normal heart sounds.   Pulmonary:      Effort: Pulmonary effort is normal.      Breath sounds: Normal breath sounds.      Comments: Lungs essentially CTA b/l  Abdominal:      General: There is no distension.      Palpations: Abdomen is soft. There is no mass.      Tenderness: There is no abdominal tenderness.   Musculoskeletal:      Right lower leg: No edema.      Left lower leg: No edema.   Skin:     Coloration: Skin is not jaundiced.      Findings: No rash.   Neurological:      General: No focal deficit present.      Mental Status: He is alert and oriented to person, place, and time.   Psychiatric:         Mood and Affect: Mood normal.         Behavior: Behavior normal.         Thought Content: Thought content normal.         Judgment: " Judgment normal.         Assessment/Plan   Diagnoses and all orders for this visit:  Vitamin B12 deficiency  -     cyanocobalamin (Vitamin B-12) 500 mcg tablet; Take 1 tablet (500 mcg) by mouth once daily.

## 2024-04-04 ENCOUNTER — APPOINTMENT (OUTPATIENT)
Dept: NEUROLOGY | Facility: HOSPITAL | Age: 64
End: 2024-04-04
Payer: COMMERCIAL

## 2024-04-21 RX ORDER — NEBIVOLOL 20 MG/1
20 TABLET ORAL DAILY
Qty: 30 TABLET | OUTPATIENT
Start: 2024-04-21

## 2024-04-29 ASSESSMENT — ENCOUNTER SYMPTOMS
FEVER: 0
NAUSEA: 0
ENDOCRINE NEGATIVE: 1
DIARRHEA: 0
DIZZINESS: 0
DIFFICULTY URINATING: 0
SHORTNESS OF BREATH: 0

## 2024-05-13 ENCOUNTER — PATIENT OUTREACH (OUTPATIENT)
Dept: PRIMARY CARE | Facility: CLINIC | Age: 64
End: 2024-05-13
Payer: COMMERCIAL

## 2024-05-13 NOTE — PROGRESS NOTES
Patient has met target of no readmission for (90) days post (hospital, SNF, rehab) discharge and is graduated from Transitional Care Management program at this time.    Harvey Garibay LPN

## 2024-05-23 DIAGNOSIS — I10 PRIMARY HYPERTENSION: Primary | ICD-10-CM

## 2024-05-26 RX ORDER — NEBIVOLOL 20 MG/1
20 TABLET ORAL DAILY
Qty: 30 TABLET | Refills: 11 | Status: SHIPPED | OUTPATIENT
Start: 2024-05-26 | End: 2024-06-03

## 2024-05-28 DIAGNOSIS — I10 PRIMARY HYPERTENSION: ICD-10-CM

## 2024-06-03 RX ORDER — NEBIVOLOL 20 MG/1
20 TABLET ORAL DAILY
Qty: 90 TABLET | Refills: 3 | Status: SHIPPED | OUTPATIENT
Start: 2024-06-03

## 2024-06-17 DIAGNOSIS — R06.02 SHORTNESS OF BREATH: ICD-10-CM

## 2024-06-19 RX ORDER — FLUTICASONE FUROATE, UMECLIDINIUM BROMIDE AND VILANTEROL TRIFENATATE 200; 62.5; 25 UG/1; UG/1; UG/1
1 POWDER RESPIRATORY (INHALATION) DAILY
Qty: 60 EACH | Refills: 5 | Status: SHIPPED | OUTPATIENT
Start: 2024-06-19

## 2024-06-19 RX ORDER — ALBUTEROL SULFATE 90 UG/1
2 AEROSOL, METERED RESPIRATORY (INHALATION) EVERY 4 HOURS PRN
Qty: 18 G | Refills: 5 | Status: SHIPPED | OUTPATIENT
Start: 2024-06-19

## 2024-07-01 ENCOUNTER — APPOINTMENT (OUTPATIENT)
Dept: PRIMARY CARE | Facility: CLINIC | Age: 64
End: 2024-07-01
Payer: COMMERCIAL

## 2024-07-22 ENCOUNTER — APPOINTMENT (OUTPATIENT)
Dept: PRIMARY CARE | Facility: CLINIC | Age: 64
End: 2024-07-22
Payer: COMMERCIAL

## 2024-07-22 VITALS
HEIGHT: 69 IN | SYSTOLIC BLOOD PRESSURE: 120 MMHG | DIASTOLIC BLOOD PRESSURE: 58 MMHG | HEART RATE: 64 BPM | OXYGEN SATURATION: 98 % | BODY MASS INDEX: 26.51 KG/M2 | WEIGHT: 179 LBS

## 2024-07-22 DIAGNOSIS — I10 PRIMARY HYPERTENSION: Primary | ICD-10-CM

## 2024-07-22 DIAGNOSIS — M25.531 RIGHT WRIST PAIN: ICD-10-CM

## 2024-07-22 PROCEDURE — 3008F BODY MASS INDEX DOCD: CPT | Performed by: FAMILY MEDICINE

## 2024-07-22 PROCEDURE — 99213 OFFICE O/P EST LOW 20 MIN: CPT | Performed by: FAMILY MEDICINE

## 2024-07-22 PROCEDURE — 3074F SYST BP LT 130 MM HG: CPT | Performed by: FAMILY MEDICINE

## 2024-07-22 PROCEDURE — 3078F DIAST BP <80 MM HG: CPT | Performed by: FAMILY MEDICINE

## 2024-07-22 ASSESSMENT — ENCOUNTER SYMPTOMS
ENDOCRINE NEGATIVE: 1
SHORTNESS OF BREATH: 0
DEPRESSION: 0
DIARRHEA: 0
FEVER: 0
DIZZINESS: 0
OCCASIONAL FEELINGS OF UNSTEADINESS: 0
LOSS OF SENSATION IN FEET: 0
NAUSEA: 0
DIFFICULTY URINATING: 0

## 2024-07-22 ASSESSMENT — PAIN SCALES - GENERAL: PAINLEVEL: 3

## 2024-07-22 NOTE — PROGRESS NOTES
"Subjective   Patient ID: Andrez Flores is a 63 y.o. male who presents for Hypertension (6 month follow up).    Hypertension  Patient is here for follow-up of elevated blood pressure. He is exercising and is adherent to a low-salt diet. Blood pressure is well controlled at home. Cardiac symptoms: none. Patient denies chest pain. Cardiovascular risk factors: hypertension and male gender. Use of agents associated with hypertension: none and prescriptions meds. History of target organ damage: none.    This pain worse with grabbing and lifting holding items.  History of carpal tunnel in the left wrist, the right wrist feels the same as the carpal tunnel pain.  It is a dull pain sometimes with some numbness and tingling that has progressively worsened over the past year or longer         Review of Systems   Constitutional:  Negative for fever.        Also see HPI   Eyes:  Negative for visual disturbance.   Respiratory:  Negative for shortness of breath.    Cardiovascular:  Negative for chest pain.   Gastrointestinal:  Negative for diarrhea and nausea.   Endocrine: Negative.    Genitourinary:  Negative for difficulty urinating.   Skin:  Negative for rash.   Neurological:  Negative for dizziness.        No focal deficits   Psychiatric/Behavioral:  Negative for suicidal ideas.    All other systems reviewed and are negative.      Objective   /58   Pulse 64   Ht 1.753 m (5' 9\")   Wt 81.2 kg (179 lb)   SpO2 98%   BMI 26.43 kg/m²     Physical Exam  Vitals and nursing note reviewed.   Constitutional:       Appearance: Normal appearance.   HENT:      Head: Normocephalic and atraumatic.   Eyes:      Extraocular Movements: Extraocular movements intact.      Conjunctiva/sclera: Conjunctivae normal.   Cardiovascular:      Rate and Rhythm: Normal rate and regular rhythm.      Heart sounds: Normal heart sounds.   Pulmonary:      Effort: Pulmonary effort is normal.      Breath sounds: Normal breath sounds.      Comments: Lungs " essentially CTA b/l  Abdominal:      General: There is no distension.      Palpations: Abdomen is soft. There is no mass.      Tenderness: There is no abdominal tenderness.   Musculoskeletal:      Right wrist: Tenderness present. Decreased range of motion.      Right lower leg: No edema.      Left lower leg: No edema.      Comments: There is some tenderness with palpation of the right wrist and the hypothenar eminence region as well as there is decreased range of motion due to pain with Phalen and reverse Phalen sign   Skin:     Coloration: Skin is not jaundiced.      Findings: No rash.   Neurological:      General: No focal deficit present.      Mental Status: He is alert and oriented to person, place, and time.   Psychiatric:         Mood and Affect: Mood normal.         Behavior: Behavior normal.         Thought Content: Thought content normal.         Judgment: Judgment normal.         Assessment/Plan   Diagnoses and all orders for this visit:  Primary hypertension  Right wrist pain  -     EMG & nerve conduction; Future

## 2024-08-06 ENCOUNTER — APPOINTMENT (OUTPATIENT)
Dept: NEUROLOGY | Facility: CLINIC | Age: 64
End: 2024-08-06
Payer: MEDICARE

## 2024-08-06 DIAGNOSIS — M79.601 PAIN OF RIGHT UPPER EXTREMITY: Primary | ICD-10-CM

## 2024-08-06 DIAGNOSIS — M25.531 RIGHT WRIST PAIN: ICD-10-CM

## 2024-08-06 DIAGNOSIS — R20.0 NUMBNESS: ICD-10-CM

## 2024-08-06 PROCEDURE — 95886 MUSC TEST DONE W/N TEST COMP: CPT | Performed by: PSYCHIATRY & NEUROLOGY

## 2024-08-06 PROCEDURE — 95911 NRV CNDJ TEST 9-10 STUDIES: CPT | Performed by: PSYCHIATRY & NEUROLOGY

## 2024-08-06 NOTE — PROGRESS NOTES
Nerve conduction studies and needle EMG was performed today on this patient.  Full scanned report is available in the Procedure tab in Epic (Chart Review, Procedure tab, double-click the report to enlarge it).  Note:  Dr. Dominguez remains available to perform EMG studies until 2024, when he will be retiring.    Impression:  Nerve conduction study and needle examination of the right upper extremity were performed, see details in table.  The results were abnormal because of the followin. Prolonged median sensory and median palmar latencies.  Lowish median sensory amplitudes.  Borderline median motor amplitude. The results are consistent with a right median neuropathy at the wrist (carpal tunnel syndrome), mild in degree.      Jones Dominguez MD

## 2025-01-22 ENCOUNTER — APPOINTMENT (OUTPATIENT)
Dept: PRIMARY CARE | Facility: CLINIC | Age: 65
End: 2025-01-22
Payer: COMMERCIAL

## 2025-01-22 NOTE — PROGRESS NOTES
Subjective   Patient ID: Andrez Flores is a 64 y.o. male who presents for No chief complaint on file..    Last clinic visit summary (07/22/2024)  -HTN: BP was normal at 120/58. Continues on losartan 50 mg daily and hydrochlorothiazide 12.5 mg daily. Doing well and no side-effects reported.   -Right wrist pain: Pain worse with grabbing and lifting holding items. History of carpal tunnel in the left wrist. Right wrist feels the same as the carpal tunnel pain. Dull pain sometimes with some numbness and tingling that has progressively worsened over the past year or longer. EMG ordered for further evaluation.     HPI   The pt presents to the clinic for a 6-month follow-up. Past medical hx of HTN, HLD, GERD, CVA, and COPD.    Review of Systems    Objective   There were no vitals taken for this visit.    Physical Exam    Assessment/Plan   {Assess/PlanSmartLinks:23307}

## 2025-01-28 ENCOUNTER — APPOINTMENT (OUTPATIENT)
Dept: PRIMARY CARE | Facility: CLINIC | Age: 65
End: 2025-01-28
Payer: MEDICARE

## 2025-01-28 VITALS
HEIGHT: 69 IN | DIASTOLIC BLOOD PRESSURE: 70 MMHG | HEART RATE: 88 BPM | SYSTOLIC BLOOD PRESSURE: 128 MMHG | OXYGEN SATURATION: 99 % | WEIGHT: 190 LBS | TEMPERATURE: 98.1 F | BODY MASS INDEX: 28.14 KG/M2

## 2025-01-28 DIAGNOSIS — E53.8 VITAMIN B12 DEFICIENCY: ICD-10-CM

## 2025-01-28 DIAGNOSIS — I10 PRIMARY HYPERTENSION: Primary | ICD-10-CM

## 2025-01-28 DIAGNOSIS — R53.83 TIRED: ICD-10-CM

## 2025-01-28 DIAGNOSIS — Z12.5 SCREENING FOR PROSTATE CANCER: ICD-10-CM

## 2025-01-28 DIAGNOSIS — R73.9 HYPERGLYCEMIA: ICD-10-CM

## 2025-01-28 DIAGNOSIS — M79.2 NEUROPATHIC PAIN: ICD-10-CM

## 2025-01-28 PROCEDURE — 3074F SYST BP LT 130 MM HG: CPT | Performed by: FAMILY MEDICINE

## 2025-01-28 PROCEDURE — 1126F AMNT PAIN NOTED NONE PRSNT: CPT | Performed by: FAMILY MEDICINE

## 2025-01-28 PROCEDURE — 1159F MED LIST DOCD IN RCRD: CPT | Performed by: FAMILY MEDICINE

## 2025-01-28 PROCEDURE — 99214 OFFICE O/P EST MOD 30 MIN: CPT | Performed by: FAMILY MEDICINE

## 2025-01-28 PROCEDURE — 3078F DIAST BP <80 MM HG: CPT | Performed by: FAMILY MEDICINE

## 2025-01-28 PROCEDURE — 1160F RVW MEDS BY RX/DR IN RCRD: CPT | Performed by: FAMILY MEDICINE

## 2025-01-28 PROCEDURE — 3008F BODY MASS INDEX DOCD: CPT | Performed by: FAMILY MEDICINE

## 2025-01-28 RX ORDER — GABAPENTIN 300 MG/1
300 CAPSULE ORAL NIGHTLY
Qty: 30 CAPSULE | Refills: 2 | Status: SHIPPED | OUTPATIENT
Start: 2025-01-28

## 2025-01-28 ASSESSMENT — ENCOUNTER SYMPTOMS
FEVER: 0
ENDOCRINE NEGATIVE: 1
SHORTNESS OF BREATH: 0
DIFFICULTY URINATING: 0
NAUSEA: 0
DIZZINESS: 0
DIARRHEA: 0
NUMBNESS: 1

## 2025-01-28 ASSESSMENT — PAIN SCALES - GENERAL: PAINLEVEL_OUTOF10: 0-NO PAIN

## 2025-01-28 NOTE — PROGRESS NOTES
Subjective   Patient ID: Andrez Flores is a 64 y.o. male who presents for Hypertension.    Last clinic visit summary (07/22/2024)  -Hypertension: BP was normal at 120/58. Continues on losartan 50 mg daily and hydrochlorothiazide 12.5 mg daily. Doing well and no side-effects reported.   -Right wrist pain: Pain worse with grabbing and lifting holding items. History of carpal tunnel in the left wrist. Right wrist feels the same as the carpal tunnel pain. Dull pain sometimes with some numbness and tingling that has progressively worsened over the past year or longer. EMG ordered for further evaluation.     HPI   The pt presents to the clinic for a 6-month follow-up. Past medical hx of HTN, HLD, GERD, CVA, and COPD.    -Health maintenance: Annual labs/tests ordered for pt.    -COPD: Stable. Pt has been breathing well.    -Hypertension: BP stable at 128/70. Continues on anti-hypertensive meds (losartan, nebivolol, hydrochlorothiazide). Doing well on these meds and no side-effects reported.    -Bilateral foot neuropathic pain: Pt reports that he has been suffering from bilateral leg/foot pain recently. He often suffers from burning sensation in his bilateral feet at night. He states that his left foot pain is worse than right foot pain. Pt received prescription for gabapentin 300 mg (once nightly) for treatment of this condition.    -Tobacco use: Pt is a current smoker. He has been smoking 0.9 pack per day for ~50 years. Had been taking Chantix medication to control smoking previously (helped slightly).    Review of Systems   Constitutional:  Negative for fever.        Also see HPI   Eyes:  Negative for visual disturbance.   Respiratory:  Negative for shortness of breath.    Cardiovascular:  Negative for chest pain.   Gastrointestinal:  Negative for diarrhea and nausea.   Endocrine: Negative.    Genitourinary:  Negative for difficulty urinating.   Skin:  Negative for rash.   Neurological:  Positive for numbness. Negative for  "dizziness.        No focal deficits  Burning/tingling in bilateral legs/feet   Psychiatric/Behavioral:  Negative for suicidal ideas.    All other systems reviewed and are negative.      Objective   /70   Pulse 88   Temp 36.7 °C (98.1 °F)   Ht 1.753 m (5' 9\")   Wt 86.2 kg (190 lb)   SpO2 99%   BMI 28.06 kg/m²     Physical Exam  Vitals and nursing note reviewed.   Constitutional:       Appearance: Normal appearance.   HENT:      Head: Normocephalic and atraumatic.   Eyes:      Conjunctiva/sclera: Conjunctivae normal.   Cardiovascular:      Rate and Rhythm: Normal rate and regular rhythm.      Heart sounds: Normal heart sounds.   Pulmonary:      Effort: Pulmonary effort is normal.      Breath sounds: Normal breath sounds.   Neurological:      Mental Status: He is oriented to person, place, and time.   Psychiatric:         Mood and Affect: Mood normal.         Behavior: Behavior normal.         Assessment/Plan   Diagnoses and all orders for this visit:  Primary hypertension  -     Uric Acid; Future  -     Albumin-Creatinine Ratio, Urine Random; Future  -     CBC and Auto Differential; Future  -     Comprehensive Metabolic Panel; Future  -     Lipid Panel; Future  -     TSH with reflex to Free T4 if abnormal; Future  Screening for prostate cancer  -     Prostate Specific Antigen, Screen; Future  Tired  Vitamin B12 deficiency  -     Vitamin B12; Future  Hyperglycemia  -     Hemoglobin A1C; Future  Neuropathic pain  -     gabapentin (Neurontin) 300 mg capsule; Take 1 capsule (300 mg) by mouth once daily at bedtime.  -     Follow Up In Primary Care - Established; Future         Scribe Attestation  By signing my name below, IMichael Scrfabi   attest that this documentation has been prepared under the direction and in the presence of Chad Bynum DO.    "

## 2025-01-30 LAB
ALBUMIN SERPL-MCNC: 4.3 G/DL (ref 3.6–5.1)
ALP SERPL-CCNC: 25 U/L (ref 35–144)
ALT SERPL-CCNC: 23 U/L (ref 9–46)
ANION GAP SERPL CALCULATED.4IONS-SCNC: 11 MMOL/L (CALC) (ref 7–17)
AST SERPL-CCNC: 20 U/L (ref 10–35)
BASOPHILS # BLD AUTO: 94 CELLS/UL (ref 0–200)
BASOPHILS NFR BLD AUTO: 1.2 %
BILIRUB SERPL-MCNC: 0.4 MG/DL (ref 0.2–1.2)
BUN SERPL-MCNC: 23 MG/DL (ref 7–25)
CALCIUM SERPL-MCNC: 9.9 MG/DL (ref 8.6–10.3)
CHLORIDE SERPL-SCNC: 109 MMOL/L (ref 98–110)
CHOLEST SERPL-MCNC: 165 MG/DL
CHOLEST/HDLC SERPL: 3.2 (CALC)
CO2 SERPL-SCNC: 20 MMOL/L (ref 20–32)
CREAT SERPL-MCNC: 1.04 MG/DL (ref 0.7–1.35)
EGFRCR SERPLBLD CKD-EPI 2021: 80 ML/MIN/1.73M2
EOSINOPHIL # BLD AUTO: 226 CELLS/UL (ref 15–500)
EOSINOPHIL NFR BLD AUTO: 2.9 %
ERYTHROCYTE [DISTWIDTH] IN BLOOD BY AUTOMATED COUNT: 13.7 % (ref 11–15)
EST. AVERAGE GLUCOSE BLD GHB EST-MCNC: 117 MG/DL
EST. AVERAGE GLUCOSE BLD GHB EST-SCNC: 6.5 MMOL/L
GLUCOSE SERPL-MCNC: 94 MG/DL (ref 65–99)
HBA1C MFR BLD: 5.7 % OF TOTAL HGB
HCT VFR BLD AUTO: 40 % (ref 38.5–50)
HDLC SERPL-MCNC: 52 MG/DL
HGB BLD-MCNC: 12.5 G/DL (ref 13.2–17.1)
LDLC SERPL CALC-MCNC: 97 MG/DL (CALC)
LYMPHOCYTES # BLD AUTO: 2246 CELLS/UL (ref 850–3900)
LYMPHOCYTES NFR BLD AUTO: 28.8 %
MCH RBC QN AUTO: 27 PG (ref 27–33)
MCHC RBC AUTO-ENTMCNC: 31.3 G/DL (ref 32–36)
MCV RBC AUTO: 86.4 FL (ref 80–100)
MONOCYTES # BLD AUTO: 788 CELLS/UL (ref 200–950)
MONOCYTES NFR BLD AUTO: 10.1 %
NEUTROPHILS # BLD AUTO: 4446 CELLS/UL (ref 1500–7800)
NEUTROPHILS NFR BLD AUTO: 57 %
NONHDLC SERPL-MCNC: 113 MG/DL (CALC)
PLATELET # BLD AUTO: 253 THOUSAND/UL (ref 140–400)
PMV BLD REES-ECKER: 12.2 FL (ref 7.5–12.5)
POTASSIUM SERPL-SCNC: 4.7 MMOL/L (ref 3.5–5.3)
PROT SERPL-MCNC: 6.6 G/DL (ref 6.1–8.1)
PSA SERPL-MCNC: 1.74 NG/ML
RBC # BLD AUTO: 4.63 MILLION/UL (ref 4.2–5.8)
SODIUM SERPL-SCNC: 140 MMOL/L (ref 135–146)
TRIGL SERPL-MCNC: 74 MG/DL
TSH SERPL-ACNC: 1.24 MIU/L (ref 0.4–4.5)
URATE SERPL-MCNC: 5.3 MG/DL (ref 4–8)
VIT B12 SERPL-MCNC: 1290 PG/ML (ref 200–1100)
WBC # BLD AUTO: 7.8 THOUSAND/UL (ref 3.8–10.8)

## 2025-02-28 ENCOUNTER — OFFICE VISIT (OUTPATIENT)
Dept: PRIMARY CARE | Facility: CLINIC | Age: 65
End: 2025-02-28
Payer: MEDICARE

## 2025-02-28 VITALS
OXYGEN SATURATION: 98 % | TEMPERATURE: 97.9 F | HEIGHT: 69 IN | SYSTOLIC BLOOD PRESSURE: 138 MMHG | WEIGHT: 193.6 LBS | BODY MASS INDEX: 28.68 KG/M2 | DIASTOLIC BLOOD PRESSURE: 78 MMHG | HEART RATE: 72 BPM

## 2025-02-28 DIAGNOSIS — M79.2 NEUROPATHIC PAIN: ICD-10-CM

## 2025-02-28 DIAGNOSIS — R53.83 TIRED: ICD-10-CM

## 2025-02-28 DIAGNOSIS — D64.9 ANEMIA, UNSPECIFIED TYPE: Primary | ICD-10-CM

## 2025-02-28 DIAGNOSIS — Z72.0 TOBACCO USE: ICD-10-CM

## 2025-02-28 PROCEDURE — 1125F AMNT PAIN NOTED PAIN PRSNT: CPT | Performed by: FAMILY MEDICINE

## 2025-02-28 PROCEDURE — 1160F RVW MEDS BY RX/DR IN RCRD: CPT | Performed by: FAMILY MEDICINE

## 2025-02-28 PROCEDURE — 99214 OFFICE O/P EST MOD 30 MIN: CPT | Performed by: FAMILY MEDICINE

## 2025-02-28 PROCEDURE — 1159F MED LIST DOCD IN RCRD: CPT | Performed by: FAMILY MEDICINE

## 2025-02-28 PROCEDURE — 3075F SYST BP GE 130 - 139MM HG: CPT | Performed by: FAMILY MEDICINE

## 2025-02-28 PROCEDURE — 3008F BODY MASS INDEX DOCD: CPT | Performed by: FAMILY MEDICINE

## 2025-02-28 PROCEDURE — 3078F DIAST BP <80 MM HG: CPT | Performed by: FAMILY MEDICINE

## 2025-02-28 RX ORDER — GABAPENTIN 300 MG/1
300 CAPSULE ORAL 2 TIMES DAILY
Qty: 60 CAPSULE | Refills: 2 | Status: SHIPPED | OUTPATIENT
Start: 2025-02-28

## 2025-02-28 RX ORDER — VARENICLINE TARTRATE 1 MG/1
1 TABLET, FILM COATED ORAL 2 TIMES DAILY
Qty: 60 TABLET | Refills: 5 | Status: SHIPPED | OUTPATIENT
Start: 2025-02-28

## 2025-02-28 RX ORDER — VARENICLINE TARTRATE 0.5 MG/1
TABLET, FILM COATED ORAL
Qty: 11 TABLET | Refills: 0 | Status: SHIPPED | OUTPATIENT
Start: 2025-02-28 | End: 2025-03-07

## 2025-02-28 ASSESSMENT — ENCOUNTER SYMPTOMS
DIARRHEA: 0
NAUSEA: 0
DIFFICULTY URINATING: 0
SHORTNESS OF BREATH: 0
DIZZINESS: 0
FEVER: 0
ENDOCRINE NEGATIVE: 1

## 2025-02-28 ASSESSMENT — PATIENT HEALTH QUESTIONNAIRE - PHQ9
1. LITTLE INTEREST OR PLEASURE IN DOING THINGS: NOT AT ALL
SUM OF ALL RESPONSES TO PHQ9 QUESTIONS 1 AND 2: 0
2. FEELING DOWN, DEPRESSED OR HOPELESS: NOT AT ALL

## 2025-02-28 ASSESSMENT — PAIN SCALES - GENERAL: PAINLEVEL_OUTOF10: 3

## 2025-02-28 NOTE — PROGRESS NOTES
"Subjective   Patient ID: Andrez Flores is a 64 y.o. male who presents for Follow-up (Neuropathic pain) and lab results (Discuss/).    HPI   The pt presents to the clinic for a follow-up and to review lab results. Past medical hx of HTN, HLD, GERD, CVA, and COPD.    -Neuropathic pain: Pt has been experiencing neuropathic pain in bilateral lower extremities. Controlling this condition with gabapentin 300 mg nightly for the past month. No side-effects reported to this dose of medication and responding well. As such, pt advised to increase dose of gabapentin medication from 300 mg once daily to 300 mg twice daily to further control this condition.    -Hypertension: Controlled. BP normal at 138/78 when checked in clinic today. Taking losartan 50 mg daily, hydrochlorothiazide 12.5 mg daily, and nebivolol 20 mg daily. Doing well on these medications. No side-effects reported.    -Health maintenance/Anemia: Pt's lab results from 1/29/2025 were comprehensively reviewed and discussed with pt. His lab results were mostly normal, but pt did have mild anemia (hemoglobin 12.5). Denies any black tarry stools. Repeat labs ordered for further investigation of this condition.    Review of Systems   Constitutional:  Negative for fever.        Also see HPI   Eyes:  Negative for visual disturbance.   Respiratory:  Negative for shortness of breath.    Cardiovascular:  Negative for chest pain.   Gastrointestinal:  Negative for diarrhea and nausea.   Endocrine: Negative.    Genitourinary:  Negative for difficulty urinating.   Skin:  Negative for rash.   Neurological:  Negative for dizziness.        No focal deficits   Psychiatric/Behavioral:  Negative for suicidal ideas.    All other systems reviewed and are negative.      Objective   /78   Pulse 72   Temp 36.6 °C (97.9 °F)   Ht 1.753 m (5' 9\")   Wt 87.8 kg (193 lb 9.6 oz)   SpO2 98%   BMI 28.59 kg/m²     Physical Exam  Vitals and nursing note reviewed.   Constitutional:       " Appearance: Normal appearance.   HENT:      Head: Normocephalic and atraumatic.   Eyes:      Conjunctiva/sclera: Conjunctivae normal.   Cardiovascular:      Rate and Rhythm: Normal rate and regular rhythm.      Heart sounds: Normal heart sounds.   Pulmonary:      Effort: Pulmonary effort is normal.      Breath sounds: Normal breath sounds.   Neurological:      Mental Status: He is oriented to person, place, and time.   Psychiatric:         Mood and Affect: Mood normal.         Behavior: Behavior normal.         Assessment/Plan   Diagnoses and all orders for this visit:  Anemia, unspecified type  -     CBC and Auto Differential; Future  -     Iron and TIBC; Future  -     Ferritin; Future  -     Follow Up In Primary Care - Established; Future  Neuropathic pain  -     Follow Up In Primary Care - Established  -     gabapentin (Neurontin) 300 mg capsule; Take 1 capsule (300 mg) by mouth 2 times a day.  -     CBC and Auto Differential; Future  -     Iron and TIBC; Future  -     Ferritin; Future  Tired  -     Follow Up In Primary Care - Established; Future  Tobacco use         Scribe Attestation  By signing my name below, IMichael , Scribmilvia   attest that this documentation has been prepared under the direction and in the presence of Chad Bynum DO.

## 2025-03-04 LAB
BASOPHILS # BLD AUTO: 90 CELLS/UL (ref 0–200)
BASOPHILS NFR BLD AUTO: 0.9 %
EOSINOPHIL # BLD AUTO: 250 CELLS/UL (ref 15–500)
EOSINOPHIL NFR BLD AUTO: 2.5 %
ERYTHROCYTE [DISTWIDTH] IN BLOOD BY AUTOMATED COUNT: 14.2 % (ref 11–15)
FERRITIN SERPL-MCNC: 8 NG/ML (ref 24–380)
HCT VFR BLD AUTO: 40.9 % (ref 38.5–50)
HGB BLD-MCNC: 12.6 G/DL (ref 13.2–17.1)
IRON SATN MFR SERPL: 7 % (CALC) (ref 20–48)
IRON SERPL-MCNC: 26 MCG/DL (ref 50–180)
LYMPHOCYTES # BLD AUTO: 2820 CELLS/UL (ref 850–3900)
LYMPHOCYTES NFR BLD AUTO: 28.2 %
MCH RBC QN AUTO: 26.5 PG (ref 27–33)
MCHC RBC AUTO-ENTMCNC: 30.8 G/DL (ref 32–36)
MCV RBC AUTO: 85.9 FL (ref 80–100)
MONOCYTES # BLD AUTO: 860 CELLS/UL (ref 200–950)
MONOCYTES NFR BLD AUTO: 8.6 %
NEUTROPHILS # BLD AUTO: 5980 CELLS/UL (ref 1500–7800)
NEUTROPHILS NFR BLD AUTO: 59.8 %
PLATELET # BLD AUTO: 273 THOUSAND/UL (ref 140–400)
PMV BLD REES-ECKER: 12 FL (ref 7.5–12.5)
RBC # BLD AUTO: 4.76 MILLION/UL (ref 4.2–5.8)
TIBC SERPL-MCNC: 356 MCG/DL (CALC) (ref 250–425)
WBC # BLD AUTO: 10 THOUSAND/UL (ref 3.8–10.8)

## 2025-03-16 ENCOUNTER — TELEPHONE (OUTPATIENT)
Dept: PRIMARY CARE | Facility: CLINIC | Age: 65
End: 2025-03-16
Payer: MEDICARE

## 2025-03-16 DIAGNOSIS — D50.9 IRON DEFICIENCY ANEMIA, UNSPECIFIED IRON DEFICIENCY ANEMIA TYPE: Primary | ICD-10-CM

## 2025-03-16 RX ORDER — FERROUS SULFATE 325(65) MG
TABLET ORAL
Qty: 30 TABLET | Refills: 5 | Status: SHIPPED | OUTPATIENT
Start: 2025-03-16

## 2025-03-28 ENCOUNTER — OFFICE VISIT (OUTPATIENT)
Dept: PRIMARY CARE | Facility: CLINIC | Age: 65
End: 2025-03-28
Payer: MEDICARE

## 2025-03-28 VITALS
HEART RATE: 66 BPM | SYSTOLIC BLOOD PRESSURE: 122 MMHG | DIASTOLIC BLOOD PRESSURE: 70 MMHG | BODY MASS INDEX: 28.73 KG/M2 | WEIGHT: 194 LBS | TEMPERATURE: 97.9 F | OXYGEN SATURATION: 99 % | HEIGHT: 69 IN

## 2025-03-28 DIAGNOSIS — R53.83 TIRED: ICD-10-CM

## 2025-03-28 DIAGNOSIS — D64.9 ANEMIA, UNSPECIFIED TYPE: ICD-10-CM

## 2025-03-28 PROCEDURE — 3008F BODY MASS INDEX DOCD: CPT | Performed by: FAMILY MEDICINE

## 2025-03-28 PROCEDURE — 99213 OFFICE O/P EST LOW 20 MIN: CPT | Performed by: FAMILY MEDICINE

## 2025-03-28 PROCEDURE — 1126F AMNT PAIN NOTED NONE PRSNT: CPT | Performed by: FAMILY MEDICINE

## 2025-03-28 PROCEDURE — 3078F DIAST BP <80 MM HG: CPT | Performed by: FAMILY MEDICINE

## 2025-03-28 PROCEDURE — 3074F SYST BP LT 130 MM HG: CPT | Performed by: FAMILY MEDICINE

## 2025-03-28 PROCEDURE — 1159F MED LIST DOCD IN RCRD: CPT | Performed by: FAMILY MEDICINE

## 2025-03-28 ASSESSMENT — ENCOUNTER SYMPTOMS
FEVER: 0
DIARRHEA: 0
SHORTNESS OF BREATH: 0
ENDOCRINE NEGATIVE: 1
DIFFICULTY URINATING: 0
NAUSEA: 0
DIZZINESS: 0

## 2025-03-28 ASSESSMENT — PATIENT HEALTH QUESTIONNAIRE - PHQ9
2. FEELING DOWN, DEPRESSED OR HOPELESS: NOT AT ALL
SUM OF ALL RESPONSES TO PHQ9 QUESTIONS 1 AND 2: 0
1. LITTLE INTEREST OR PLEASURE IN DOING THINGS: NOT AT ALL

## 2025-03-28 ASSESSMENT — PAIN SCALES - GENERAL: PAINLEVEL_OUTOF10: 0-NO PAIN

## 2025-03-28 NOTE — PROGRESS NOTES
"Subjective   Patient ID: Andrez Flores is a 64 y.o. male who presents for Follow-up (HTN/Lab results ) and Knee Problem (B/l knees are discolored-saw pain doctor and he was not concerned but patient states that they look odd).    HPI   The pt presents to the clinic for a follow-up and concerns of knee issues. Past medical hx of HTN, HLD, GERD, CVA, and COPD.    -Bilateral knee issues: Pt endorses some discoloration on his bilateral knees. Denies any injury/trauma that may have caused this condition. Pt recalls that he consulted with his pain management specialist about this condition and the specialist thought that this condition was not concerning. Upon inspection, pt informed that he is likely suffering from vasculitis. Pt received prescription for prednisone medication to treat this condition.     Review of Systems   Constitutional:  Negative for fever.        Also see HPI   Eyes:  Negative for visual disturbance.   Respiratory:  Negative for shortness of breath.    Cardiovascular:  Negative for chest pain.   Gastrointestinal:  Negative for diarrhea and nausea.   Endocrine: Negative.    Genitourinary:  Negative for difficulty urinating.   Skin:  Negative for rash.   Neurological:  Negative for dizziness.        No focal deficits   Psychiatric/Behavioral:  Negative for suicidal ideas.    All other systems reviewed and are negative.      Objective   /70   Pulse 66   Temp 36.6 °C (97.9 °F)   Ht 1.753 m (5' 9\")   Wt 88 kg (194 lb)   SpO2 99%   BMI 28.65 kg/m²     Physical Exam  Vitals and nursing note reviewed.   Constitutional:       Appearance: Normal appearance.   HENT:      Head: Normocephalic and atraumatic.   Eyes:      Conjunctiva/sclera: Conjunctivae normal.   Cardiovascular:      Rate and Rhythm: Normal rate and regular rhythm.      Heart sounds: Normal heart sounds.   Pulmonary:      Effort: Pulmonary effort is normal.      Breath sounds: Normal breath sounds.   Musculoskeletal:      Comments: " Discoloration on bilateral knees.   Neurological:      Mental Status: He is oriented to person, place, and time.   Psychiatric:         Mood and Affect: Mood normal.         Behavior: Behavior normal.         Assessment/Plan   Diagnoses and all orders for this visit:  Anemia, unspecified type  -     Follow Up In Primary Care - Established  Tired  -     Follow Up In Primary Care - Established         Scribe Attestation  By signing my name below, Michael DOWNEY Scribe   attest that this documentation has been prepared under the direction and in the presence of Chad Bynum DO.

## 2025-04-11 ENCOUNTER — TELEPHONE (OUTPATIENT)
Dept: CARE COORDINATION | Facility: CLINIC | Age: 65
End: 2025-04-11
Payer: MEDICARE

## 2025-04-11 ENCOUNTER — DOCUMENTATION (OUTPATIENT)
Dept: PRIMARY CARE | Facility: CLINIC | Age: 65
End: 2025-04-11
Payer: MEDICARE

## 2025-04-11 NOTE — PROGRESS NOTES
Outreach call to patient to assist in scheduling Annual Wellness Visit. Left voicemail with the following information below:    Our records indicate that you are due for your 2025 Yearly Annual Wellness Exam.    It is a very important part of your over all health to have your yearly exam with your Primary Care Provider.   During this appointment you will discuss routine screenings and vaccines you may be due for with your provider.         Please call your Primary Care Physician's office at your earliest convenience to get scheduled for your visit OR you may schedule through your [employer] account.     Sincerely,    Our caring team at [facility]     **Details below show attributed Primary Care Providers Office information:     [facility]  [address] [address]   Scheduling # [phone]

## 2025-04-16 ENCOUNTER — PATIENT OUTREACH (OUTPATIENT)
Dept: PRIMARY CARE | Facility: CLINIC | Age: 65
End: 2025-04-16
Payer: MEDICARE

## 2025-04-16 DIAGNOSIS — J41.0 SIMPLE CHRONIC BRONCHITIS (MULTI): ICD-10-CM

## 2025-04-16 DIAGNOSIS — I10 PRIMARY HYPERTENSION: ICD-10-CM

## 2025-04-16 DIAGNOSIS — M79.2 NEUROPATHIC PAIN: ICD-10-CM

## 2025-04-16 DIAGNOSIS — I63.9 CEREBROVASCULAR ACCIDENT (CVA), UNSPECIFIED MECHANISM (MULTI): ICD-10-CM

## 2025-04-16 RX ORDER — CLOPIDOGREL BISULFATE 75 MG/1
75 TABLET ORAL DAILY
COMMUNITY
End: 2025-04-16 | Stop reason: SDUPTHER

## 2025-04-16 NOTE — PROGRESS NOTES
Chart reviewed prior to call. Call placed to Andrez to open for CCM. History of CVA, HTN, COPD, GERD and hyperlipidemia. Stated that his breathing is fine. Generally no issues with SOB. He has chronic back pain. He does have gabapentin but states that it doesn't help much. He had the CVA a year ago. Walking was impacted and he stated that he should be using a cane or walker but he usually doesn't. Risk for falls. He denies any concerns. Would like Dr. Bynum to fill atorvastatin, plavix, losartan and hydrochlorothiazide. Education provided on case management, agreed to monthly calls. Contact information provided.   Lab Results   Component Value Date    WBC 10.0 03/03/2025    HGB 12.6 (L) 03/03/2025    HCT 40.9 03/03/2025     03/03/2025    CHOL 165 01/29/2025    TRIG 74 01/29/2025    HDL 52 01/29/2025    ALT 23 01/29/2025    AST 20 01/29/2025     01/29/2025    K 4.7 01/29/2025     01/29/2025    CREATININE 1.04 01/29/2025    BUN 23 01/29/2025    CO2 20 01/29/2025    TSH 1.24 01/29/2025    PSA 1.74 01/29/2025    INR 1.1 02/11/2024    HGBA1C 5.7 (H) 01/29/2025    ALBUR 12 05/03/2023

## 2025-04-22 DIAGNOSIS — I63.9 CEREBROVASCULAR ACCIDENT (CVA), UNSPECIFIED MECHANISM (MULTI): ICD-10-CM

## 2025-04-22 DIAGNOSIS — I10 PRIMARY HYPERTENSION: ICD-10-CM

## 2025-04-22 RX ORDER — CLOPIDOGREL BISULFATE 75 MG/1
75 TABLET ORAL DAILY
Status: CANCELLED | OUTPATIENT
Start: 2025-04-22

## 2025-04-22 RX ORDER — HYDROCHLOROTHIAZIDE 12.5 MG/1
12.5 CAPSULE ORAL DAILY
Qty: 90 CAPSULE | Refills: 3 | Status: CANCELLED | OUTPATIENT
Start: 2025-04-22 | End: 2026-04-22

## 2025-04-22 RX ORDER — LOSARTAN POTASSIUM 50 MG/1
50 TABLET ORAL DAILY
Qty: 90 TABLET | Refills: 3 | Status: CANCELLED | OUTPATIENT
Start: 2025-04-22 | End: 2026-04-22

## 2025-04-22 RX ORDER — ATORVASTATIN CALCIUM 40 MG/1
40 TABLET, FILM COATED ORAL NIGHTLY
Qty: 90 TABLET | Refills: 3 | Status: CANCELLED | OUTPATIENT
Start: 2025-04-22 | End: 2026-04-22

## 2025-04-26 RX ORDER — ATORVASTATIN CALCIUM 40 MG/1
40 TABLET, FILM COATED ORAL NIGHTLY
Qty: 90 TABLET | Refills: 3 | Status: SHIPPED | OUTPATIENT
Start: 2025-04-26 | End: 2026-04-26

## 2025-04-26 RX ORDER — LOSARTAN POTASSIUM 50 MG/1
50 TABLET ORAL DAILY
Qty: 90 TABLET | Refills: 3 | Status: SHIPPED | OUTPATIENT
Start: 2025-04-26 | End: 2026-04-26

## 2025-04-26 RX ORDER — HYDROCHLOROTHIAZIDE 12.5 MG/1
12.5 CAPSULE ORAL DAILY
Qty: 90 CAPSULE | Refills: 3 | Status: SHIPPED | OUTPATIENT
Start: 2025-04-26 | End: 2026-04-26

## 2025-04-26 RX ORDER — CLOPIDOGREL BISULFATE 75 MG/1
75 TABLET ORAL DAILY
Qty: 90 TABLET | Refills: 3 | Status: SHIPPED | OUTPATIENT
Start: 2025-04-26

## 2025-04-28 ENCOUNTER — OFFICE VISIT (OUTPATIENT)
Dept: PRIMARY CARE | Facility: CLINIC | Age: 65
End: 2025-04-28
Payer: MEDICARE

## 2025-04-28 VITALS
HEART RATE: 66 BPM | OXYGEN SATURATION: 98 % | SYSTOLIC BLOOD PRESSURE: 138 MMHG | WEIGHT: 190.4 LBS | DIASTOLIC BLOOD PRESSURE: 66 MMHG | TEMPERATURE: 98.4 F | HEIGHT: 69 IN | BODY MASS INDEX: 28.2 KG/M2

## 2025-04-28 DIAGNOSIS — M25.562 ACUTE PAIN OF LEFT KNEE: Primary | ICD-10-CM

## 2025-04-28 PROCEDURE — 3075F SYST BP GE 130 - 139MM HG: CPT | Performed by: FAMILY MEDICINE

## 2025-04-28 PROCEDURE — 1125F AMNT PAIN NOTED PAIN PRSNT: CPT | Performed by: FAMILY MEDICINE

## 2025-04-28 PROCEDURE — 3078F DIAST BP <80 MM HG: CPT | Performed by: FAMILY MEDICINE

## 2025-04-28 PROCEDURE — 99213 OFFICE O/P EST LOW 20 MIN: CPT | Performed by: FAMILY MEDICINE

## 2025-04-28 PROCEDURE — 1160F RVW MEDS BY RX/DR IN RCRD: CPT | Performed by: FAMILY MEDICINE

## 2025-04-28 PROCEDURE — 3008F BODY MASS INDEX DOCD: CPT | Performed by: FAMILY MEDICINE

## 2025-04-28 PROCEDURE — 1159F MED LIST DOCD IN RCRD: CPT | Performed by: FAMILY MEDICINE

## 2025-04-28 RX ORDER — PREDNISONE 10 MG/1
TABLET ORAL
Qty: 21 TABLET | Refills: 0 | Status: SHIPPED | OUTPATIENT
Start: 2025-04-28 | End: 2025-05-04

## 2025-04-28 ASSESSMENT — ENCOUNTER SYMPTOMS
DIZZINESS: 0
LOSS OF SENSATION IN FEET: 0
ENDOCRINE NEGATIVE: 1
DIARRHEA: 0
DEPRESSION: 0
DIFFICULTY URINATING: 0
SHORTNESS OF BREATH: 0
FEVER: 0
OCCASIONAL FEELINGS OF UNSTEADINESS: 0
NAUSEA: 0

## 2025-04-28 ASSESSMENT — COLUMBIA-SUICIDE SEVERITY RATING SCALE - C-SSRS
2. HAVE YOU ACTUALLY HAD ANY THOUGHTS OF KILLING YOURSELF?: NO
6. HAVE YOU EVER DONE ANYTHING, STARTED TO DO ANYTHING, OR PREPARED TO DO ANYTHING TO END YOUR LIFE?: NO
1. IN THE PAST MONTH, HAVE YOU WISHED YOU WERE DEAD OR WISHED YOU COULD GO TO SLEEP AND NOT WAKE UP?: NO

## 2025-04-28 ASSESSMENT — PATIENT HEALTH QUESTIONNAIRE - PHQ9
SUM OF ALL RESPONSES TO PHQ9 QUESTIONS 1 AND 2: 0
2. FEELING DOWN, DEPRESSED OR HOPELESS: NOT AT ALL
1. LITTLE INTEREST OR PLEASURE IN DOING THINGS: NOT AT ALL

## 2025-04-28 ASSESSMENT — PAIN SCALES - GENERAL: PAINLEVEL_OUTOF10: 4

## 2025-04-28 NOTE — PROGRESS NOTES
"Subjective   Patient ID: Andrez Flores is a 64 y.o. male who presents for Follow-up.    HPI   The pt presents to the clinic for a 1-month follow-up. Past medical hx of HTN, HLD, GERD, CVA, and COPD.       -Bilateral knee issues: Pt endorses some discoloration on his bilateral knees. Denies any injury/trauma that may have caused this condition. Pt recalls that he consulted with his pain management specialist about this condition and the specialist thought that this condition was not concerning. Upon inspection, pt informed that he is likely suffering from vasculitis. Pt received prescription for prednisone medication to treat this condition.     Review of Systems   Constitutional:  Negative for fever.        Also see HPI   Eyes:  Negative for visual disturbance.   Respiratory:  Negative for shortness of breath.    Cardiovascular:  Negative for chest pain.   Gastrointestinal:  Negative for diarrhea and nausea.   Endocrine: Negative.    Genitourinary:  Negative for difficulty urinating.   Skin:  Negative for rash.   Neurological:  Negative for dizziness.        No focal deficits   Psychiatric/Behavioral:  Negative for suicidal ideas.    All other systems reviewed and are negative.      Objective   /66   Pulse 66   Temp 36.9 °C (98.4 °F)   Ht 1.753 m (5' 9\")   Wt 86.4 kg (190 lb 6.4 oz)   SpO2 98%   BMI 28.12 kg/m²     Physical Exam  Vitals and nursing note reviewed.   Constitutional:       Appearance: Normal appearance.   HENT:      Head: Normocephalic and atraumatic.      Right Ear: Tympanic membrane, ear canal and external ear normal.      Left Ear: Tympanic membrane, ear canal and external ear normal.      Nose: Nose normal.      Mouth/Throat:      Mouth: Mucous membranes are moist.      Pharynx: Oropharynx is clear.   Eyes:      Extraocular Movements: Extraocular movements intact.      Conjunctiva/sclera: Conjunctivae normal.      Pupils: Pupils are equal, round, and reactive to light.   Cardiovascular:     "  Rate and Rhythm: Normal rate and regular rhythm.      Heart sounds: Normal heart sounds.   Pulmonary:      Effort: No respiratory distress.      Breath sounds: Normal breath sounds.   Abdominal:      General: Bowel sounds are normal. There is no distension.      Palpations: Abdomen is soft. There is no mass.      Tenderness: There is no abdominal tenderness.   Musculoskeletal:      Cervical back: Normal range of motion and neck supple.      Right lower leg: No edema.      Left lower leg: No edema.      Comments: Tenderness along the lateral aspect of the patellar region.    Skin:     Coloration: Skin is not jaundiced.      Findings: No rash.   Neurological:      General: No focal deficit present.      Mental Status: He is alert and oriented to person, place, and time.   Psychiatric:         Mood and Affect: Mood normal.         Speech: Speech normal.         Behavior: Behavior normal.         Thought Content: Thought content normal.         Judgment: Judgment normal.     Tenderness along the lateral aspect of the patellar region.     Assessment/Plan   Diagnoses and all orders for this visit:  Acute pain of left knee  -     predniSONE (Deltasone) 10 mg tablet; Take 6 tablets (60 mg) by mouth once daily for 1 day, THEN 5 tablets (50 mg) once daily for 1 day, THEN 4 tablets (40 mg) once daily for 1 day, THEN 3 tablets (30 mg) once daily for 1 day, THEN 2 tablets (20 mg) once daily for 1 day, THEN 1 tablet (10 mg) once daily for 1 day.           Scribe Attestation  By signing my name below, ILissy Scribe attest that this documentation has been prepared under the direction and in the presence of Chad Bynum DO. All medical record entries made by the Scribe were at my direction or personally dictated by me. I have reviewed the chart and agree that the record accurately reflects my personal performance of the history, physical exam, discussion and plan.

## 2025-05-16 ENCOUNTER — PATIENT OUTREACH (OUTPATIENT)
Dept: PRIMARY CARE | Facility: CLINIC | Age: 65
End: 2025-05-16
Payer: MEDICARE

## 2025-05-16 DIAGNOSIS — I10 PRIMARY HYPERTENSION: ICD-10-CM

## 2025-05-16 NOTE — PROGRESS NOTES
Care Management Monthly Outreach  Chart review completed    Last Office Visit: 4/28/2025  Next Office Visit: Visit date not found   APC: n/a    Has patient been to ER/Urgent Care since last outreach? No    Outreach attempted: left message on voicemail requesting return call..    Pennie Alfred RN, BSN, [facility]  [facility]  [phone]

## 2025-05-22 ENCOUNTER — PATIENT OUTREACH (OUTPATIENT)
Dept: PRIMARY CARE | Facility: CLINIC | Age: 65
End: 2025-05-22
Payer: MEDICARE

## 2025-05-22 DIAGNOSIS — I10 PRIMARY HYPERTENSION: ICD-10-CM

## 2025-05-22 NOTE — PROGRESS NOTES
Chart reviewed prior to call. Attempted call for monthly outreach. Left message with contact information.

## 2025-06-02 DIAGNOSIS — I10 PRIMARY HYPERTENSION: ICD-10-CM

## 2025-06-04 NOTE — TELEPHONE ENCOUNTER
LOV: 4/28          NEXT OV:    none scheduled                         LAST FILL: 6/3/24 for 1 year

## 2025-06-06 RX ORDER — NEBIVOLOL 20 MG/1
20 TABLET ORAL DAILY
Qty: 90 TABLET | Refills: 3 | Status: SHIPPED | OUTPATIENT
Start: 2025-06-06

## 2025-06-20 DIAGNOSIS — I63.9 CEREBROVASCULAR ACCIDENT (CVA), UNSPECIFIED MECHANISM (MULTI): Primary | ICD-10-CM

## 2025-06-24 ENCOUNTER — PATIENT OUTREACH (OUTPATIENT)
Dept: PRIMARY CARE | Facility: CLINIC | Age: 65
End: 2025-06-24
Payer: MEDICARE

## 2025-06-24 DIAGNOSIS — I10 PRIMARY HYPERTENSION: ICD-10-CM

## 2025-06-25 RX ORDER — ATORVASTATIN CALCIUM 40 MG/1
40 TABLET, FILM COATED ORAL NIGHTLY
Qty: 90 TABLET | Refills: 3 | Status: SHIPPED | OUTPATIENT
Start: 2025-06-25

## 2025-06-27 ENCOUNTER — DOCUMENTATION (OUTPATIENT)
Dept: PRIMARY CARE | Facility: CLINIC | Age: 65
End: 2025-06-27
Payer: MEDICARE

## 2025-06-27 NOTE — PROGRESS NOTES
6/27/2025    Dear Andrez,    Effective 6/27/2025, you have been disenrolled from our Chronic Care Management program. Unfortunately, we have been unable to establish contact with you over the past few months regarding your participation in our Chronic Care Management (CCM) program. If you wish to correct this decision please reach out to Pennie Alfred RN,BSN,ZOË at [phone]     While you are no longer part of the CCM program, please know that our commitment to your health and well-being remains. We are here to assist you and ensure that you receive the support and care you need. Please continue to follow up with your PCP, Chad Bynum, DO on a regular basis. Thank you for your understanding. Wishing you good health and all the best in your ongoing healthcare journey.        Sincerely,  Pennie Alfred RN,BSN,ZOË    [facility], RN Care Manager  [phone]  [facility], RN Care Manager  [phone]

## 2025-07-09 ENCOUNTER — DOCUMENTATION (OUTPATIENT)
Dept: PRIMARY CARE | Facility: CLINIC | Age: 65
End: 2025-07-09

## 2025-07-09 ENCOUNTER — DOCUMENTATION (OUTPATIENT)
Dept: PRIMARY CARE | Facility: CLINIC | Age: 65
End: 2025-07-09
Payer: MEDICARE

## 2025-07-09 DIAGNOSIS — I10 PRIMARY HYPERTENSION: ICD-10-CM

## 2025-07-09 DIAGNOSIS — J41.0 SIMPLE CHRONIC BRONCHITIS (MULTI): ICD-10-CM

## 2025-07-09 DIAGNOSIS — M79.2 NEUROPATHIC PAIN: ICD-10-CM

## 2025-07-09 DIAGNOSIS — I63.9 CEREBROVASCULAR ACCIDENT (CVA), UNSPECIFIED MECHANISM (MULTI): ICD-10-CM

## 2025-07-09 RX ORDER — GABAPENTIN 300 MG/1
300 CAPSULE ORAL 2 TIMES DAILY
Qty: 60 CAPSULE | Refills: 2 | Status: SHIPPED | OUTPATIENT
Start: 2025-07-09

## 2025-07-09 SDOH — HEALTH STABILITY: PHYSICAL HEALTH: ON AVERAGE, HOW MANY DAYS PER WEEK DO YOU ENGAGE IN MODERATE TO STRENUOUS EXERCISE (LIKE A BRISK WALK)?: 0 DAYS

## 2025-07-09 SDOH — HEALTH STABILITY: PHYSICAL HEALTH: ON AVERAGE, HOW MANY MINUTES DO YOU ENGAGE IN EXERCISE AT THIS LEVEL?: 0 MIN

## 2025-07-09 NOTE — PROGRESS NOTES
Patient referred to Chronic Care Management by PCP - Chad Bynum DO   Chart reviewed.    Qualifying Chronic Conditions:  Primary hypertension    Neuropathic pain    Simple chronic bronchitis (Multi)    Confirmation of insurance? Yes    Last Office Visit with PCP: 4/28/2025   Next Office Visit with PCP: Visit date not found   APC Collaboration: n/a    New Enrollment Summary:   Confirmation of two patient identifiers.  Explained that in my role as Care Manager I will:  Be a point of contact with questions and concerns  Focus on chronic conditions and achieving health goals  Make sure patient is receiving all preventative care he/she is due for    Plan of Care derived from provider's comprehensive assessment and outlined plan of care found in [facility] or other follow up.  Nature and availability of the program discussed with the patient, the patient's responsibility for potential cost sharing, only one practitioner furnishing services during a calendar month, and the right to stop services at any time.  Verbal consent received to enroll.    My contact info was provided for any needs that may arise.    Chart reviewed. Call from Andrez with interest in continuing CCM. No changes in pain. Denies dyspnea, avoids heat when he can. Needs  refill on gabapentin. Message sent to Dr. Bynum.     Medications:   Are there medication concerns that need addressed? No  Refills needed? Yes - gabapentin    Care Gaps: Addressed today  General Assessment: Completed  Social Drivers of Health: Addressed today  Care Plan initiated: Yes    Upcoming Appointments:     Last filed Vital Signs Reviewed:  BP Readings from Last 3 Encounters:   04/28/25 138/66   03/28/25 122/70   02/28/25 138/78      Labs Reviewed:  Lab Results   Component Value Date    CREATININE 1.04 01/29/2025    GLUCOSE 94 01/29/2025    ALKPHOS 25 (L) 01/29/2025    K 4.7 01/29/2025    PROT 6.6 01/29/2025     01/29/2025    AST 20 01/29/2025    ALT 23 01/29/2025    BUN 23  01/29/2025    MG 2.01 02/11/2024     Lab Results   Component Value Date    TRIG 74 01/29/2025    CHOL 165 01/29/2025    LDLCALC 97 01/29/2025    HDL 52 01/29/2025     Lab Results   Component Value Date    HGBA1C 5.7 (H) 01/29/2025    HGBA1C 5.3 02/12/2024    HGBA1C 5.2 02/12/2024     Lab Results   Component Value Date    WBC 10.0 03/03/2025    RBC 4.76 03/03/2025    HGB 12.6 (L) 03/03/2025     03/03/2025   No other concerns at this time.  Agreeable to monthly outreaches.  Encouraged to call if questions or concerns arise.    Pennie Alfred RN, BSN, [facility]  [facility]urse [facility]  [phone]

## 2025-07-10 RX ORDER — GABAPENTIN 300 MG/1
300 CAPSULE ORAL 2 TIMES DAILY
Qty: 60 CAPSULE | Refills: 0 | OUTPATIENT
Start: 2025-07-10

## 2025-08-07 ENCOUNTER — PATIENT OUTREACH (OUTPATIENT)
Dept: PRIMARY CARE | Facility: CLINIC | Age: 65
End: 2025-08-07
Payer: MEDICARE

## 2025-08-07 DIAGNOSIS — I10 PRIMARY HYPERTENSION: ICD-10-CM

## 2025-08-21 ENCOUNTER — PATIENT OUTREACH (OUTPATIENT)
Dept: PRIMARY CARE | Facility: CLINIC | Age: 65
End: 2025-08-21
Payer: MEDICARE

## 2025-08-21 DIAGNOSIS — I10 PRIMARY HYPERTENSION: ICD-10-CM

## 2025-08-21 DIAGNOSIS — J41.0 SIMPLE CHRONIC BRONCHITIS (MULTI): ICD-10-CM

## 2025-08-21 DIAGNOSIS — M79.2 NEUROPATHIC PAIN: ICD-10-CM
